# Patient Record
Sex: MALE | URBAN - NONMETROPOLITAN AREA
[De-identification: names, ages, dates, MRNs, and addresses within clinical notes are randomized per-mention and may not be internally consistent; named-entity substitution may affect disease eponyms.]

---

## 2018-07-02 ENCOUNTER — HISTORICAL (OUTPATIENT)
Dept: ADMINISTRATIVE | Facility: HOSPITAL | Age: 58
End: 2018-07-02

## 2019-07-05 ENCOUNTER — HISTORICAL (OUTPATIENT)
Dept: ADMINISTRATIVE | Facility: HOSPITAL | Age: 59
End: 2019-07-05

## 2021-09-27 ENCOUNTER — HISTORICAL (OUTPATIENT)
Dept: ADMINISTRATIVE | Facility: HOSPITAL | Age: 61
End: 2021-09-27

## 2024-07-22 ENCOUNTER — OFFICE VISIT (OUTPATIENT)
Dept: FAMILY MEDICINE | Facility: CLINIC | Age: 64
End: 2024-07-22
Payer: MEDICAID

## 2024-07-22 VITALS
SYSTOLIC BLOOD PRESSURE: 132 MMHG | BODY MASS INDEX: 28.77 KG/M2 | TEMPERATURE: 99 F | OXYGEN SATURATION: 95 % | HEIGHT: 66 IN | HEART RATE: 66 BPM | WEIGHT: 179 LBS | DIASTOLIC BLOOD PRESSURE: 78 MMHG

## 2024-07-22 DIAGNOSIS — G89.29 CHRONIC MIDLINE LOW BACK PAIN WITHOUT SCIATICA: ICD-10-CM

## 2024-07-22 DIAGNOSIS — M25.511 ACUTE PAIN OF BOTH SHOULDERS: ICD-10-CM

## 2024-07-22 DIAGNOSIS — M25.512 ACUTE PAIN OF BOTH SHOULDERS: ICD-10-CM

## 2024-07-22 DIAGNOSIS — M54.50 CHRONIC MIDLINE LOW BACK PAIN WITHOUT SCIATICA: ICD-10-CM

## 2024-07-22 DIAGNOSIS — Z76.89 ENCOUNTER TO ESTABLISH CARE: Primary | ICD-10-CM

## 2024-07-22 PROBLEM — Z78.9 DAILY CONSUMPTION OF ALCOHOL: Status: ACTIVE | Noted: 2024-07-22

## 2024-07-22 PROBLEM — F17.210 NICOTINE DEPENDENCE, CIGARETTES, UNCOMPLICATED: Status: ACTIVE | Noted: 2024-07-22

## 2024-07-22 PROCEDURE — 3078F DIAST BP <80 MM HG: CPT | Mod: CPTII,,, | Performed by: NURSE PRACTITIONER

## 2024-07-22 PROCEDURE — 83036 HEMOGLOBIN GLYCOSYLATED A1C: CPT | Performed by: NURSE PRACTITIONER

## 2024-07-22 PROCEDURE — 87389 HIV-1 AG W/HIV-1&-2 AB AG IA: CPT | Performed by: NURSE PRACTITIONER

## 2024-07-22 PROCEDURE — 3075F SYST BP GE 130 - 139MM HG: CPT | Mod: CPTII,,, | Performed by: NURSE PRACTITIONER

## 2024-07-22 PROCEDURE — 86803 HEPATITIS C AB TEST: CPT | Performed by: NURSE PRACTITIONER

## 2024-07-22 PROCEDURE — 85025 COMPLETE CBC W/AUTO DIFF WBC: CPT | Performed by: NURSE PRACTITIONER

## 2024-07-22 PROCEDURE — 84443 ASSAY THYROID STIM HORMONE: CPT | Performed by: NURSE PRACTITIONER

## 2024-07-22 PROCEDURE — 1159F MED LIST DOCD IN RCRD: CPT | Mod: CPTII,,, | Performed by: NURSE PRACTITIONER

## 2024-07-22 PROCEDURE — 80061 LIPID PANEL: CPT | Performed by: NURSE PRACTITIONER

## 2024-07-22 PROCEDURE — 99203 OFFICE O/P NEW LOW 30 MIN: CPT | Mod: ,,, | Performed by: NURSE PRACTITIONER

## 2024-07-22 PROCEDURE — 3008F BODY MASS INDEX DOCD: CPT | Mod: CPTII,,, | Performed by: NURSE PRACTITIONER

## 2024-07-22 PROCEDURE — 80053 COMPREHEN METABOLIC PANEL: CPT | Performed by: NURSE PRACTITIONER

## 2024-07-22 PROCEDURE — 1160F RVW MEDS BY RX/DR IN RCRD: CPT | Mod: CPTII,,, | Performed by: NURSE PRACTITIONER

## 2024-07-22 PROCEDURE — 84153 ASSAY OF PSA TOTAL: CPT | Performed by: NURSE PRACTITIONER

## 2024-07-22 RX ORDER — DICLOFENAC SODIUM 75 MG/1
75 TABLET, DELAYED RELEASE ORAL 2 TIMES DAILY
Qty: 60 TABLET | Refills: 0 | Status: SHIPPED | OUTPATIENT
Start: 2024-07-22 | End: 2024-08-21

## 2024-07-22 NOTE — ASSESSMENT & PLAN NOTE
Begin diclofenac b.i.d..  Avoid other NSAIDs while taking   Begin physical therapy  Ice as needed   Obtain x-rays of bilateral shoulders

## 2024-07-22 NOTE — ASSESSMENT & PLAN NOTE
Begin diclofenac b.i.d..  Avoid other NSAIDs while taking   Begin physical therapy  Ice as needed   Obtain x-rays of lumbar spine

## 2024-07-22 NOTE — PROGRESS NOTES
Patient ID: Tommy Ramirez  : 1960    Chief Complaint: Establish Care, Back Pain (Lower back. ), and Shoulder Pain (bilateral)    Allergies: Patient is allergic to penicillins.     History of Present Illness:  The patient is a 63 y.o. White male who presents to clinic for follow up on Establish Care, Back Pain (Lower back. ), and Shoulder Pain (bilateral). Last PCP was Dr. Hess many years ago.      He complains of pain to bilateral shoulders. Pain began a few weeks ago. Pain is worsened by overhead or heavy lifting. He also has a burning pain to his right arm. No weakness of the hands or arms. Denies any known injury or neck pain.     He complains of lower back pain that has been present for several years. Pain is constant and worsened by overuse. Mild improvement with ibuprofen.     He smokes 1/2 ppd; daily smoker for 50 years.     Social History:  reports that he has been smoking cigarettes. He started smoking about 53 years ago. He has a 26.8 pack-year smoking history. He has never used smokeless tobacco. He reports current alcohol use of about 28.0 standard drinks of alcohol per week. He reports that he does not use drugs.    Past Medical History:  has no past medical history on file.    Current Medications:  Current Outpatient Medications   Medication Instructions    diclofenac (VOLTAREN) 75 mg, Oral, 2 times daily       Review of Systems   Constitutional:  Negative for activity change, appetite change, fatigue and fever.   HENT:  Negative for ear pain, hearing loss and trouble swallowing.    Eyes:  Negative for pain, redness and visual disturbance.   Respiratory:  Negative for cough, chest tightness and shortness of breath.    Cardiovascular:  Negative for chest pain, palpitations, leg swelling and claudication.   Gastrointestinal:  Negative for abdominal pain, blood in stool, change in bowel habit, constipation, diarrhea, nausea and vomiting.   Endocrine: Negative for cold intolerance, heat  "intolerance, polydipsia, polyphagia and polyuria.   Genitourinary:  Negative for dysuria, frequency and hematuria.   Musculoskeletal:  Positive for arthralgias and back pain. Negative for gait problem and joint swelling.   Integumentary:  Negative for rash, wound and mole/lesion.   Neurological:  Negative for dizziness, seizures, weakness, headaches and memory loss.   Hematological:  Negative for adenopathy. Does not bruise/bleed easily.   Psychiatric/Behavioral:  Negative for confusion, sleep disturbance and suicidal ideas. The patient is not nervous/anxious.         Visit Vitals  /78   Pulse 66   Temp 98.7 °F (37.1 °C)   Ht 5' 6" (1.676 m)   Wt 81.2 kg (179 lb)   SpO2 95%   BMI 28.89 kg/m²       Physical Exam  Vitals reviewed.   Constitutional:       General: He is not in acute distress.     Appearance: Normal appearance. He is not ill-appearing.   HENT:      Right Ear: Tympanic membrane, ear canal and external ear normal.      Left Ear: Tympanic membrane, ear canal and external ear normal.      Mouth/Throat:      Mouth: Mucous membranes are moist.   Eyes:      General: No scleral icterus.     Extraocular Movements: Extraocular movements intact.      Conjunctiva/sclera: Conjunctivae normal.      Pupils: Pupils are equal, round, and reactive to light.   Cardiovascular:      Rate and Rhythm: Normal rate and regular rhythm.      Pulses: Normal pulses.      Heart sounds: Normal heart sounds.   Pulmonary:      Effort: Pulmonary effort is normal.      Breath sounds: Normal breath sounds.   Abdominal:      General: Bowel sounds are normal.      Palpations: Abdomen is soft.      Tenderness: There is no abdominal tenderness.   Musculoskeletal:      Right shoulder: Tenderness and bony tenderness present. Decreased range of motion. Decreased strength. Normal pulse.      Left shoulder: Tenderness and bony tenderness present. Decreased range of motion. Decreased strength. Normal pulse.      Cervical back: Normal range of " motion and neck supple. No tenderness.      Lumbar back: Bony tenderness present. Negative right straight leg raise test and negative left straight leg raise test.   Lymphadenopathy:      Cervical: No cervical adenopathy.   Skin:     General: Skin is warm and dry.   Neurological:      Mental Status: He is alert and oriented to person, place, and time. Mental status is at baseline.   Psychiatric:         Mood and Affect: Mood normal.         Thought Content: Thought content normal.         Judgment: Judgment normal.            Assessment & Plan:  1. Encounter to establish care  -     Cancel: CBC Auto Differential; Future; Expected date: 07/22/2024  -     Cancel: Comprehensive Metabolic Panel; Future; Expected date: 07/22/2024  -     Cancel: Lipid Panel; Future; Expected date: 07/22/2024  -     Cancel: TSH; Future; Expected date: 07/22/2024  -     Cancel: Hemoglobin A1C; Future; Expected date: 07/22/2024  -     Cancel: PSA, Screening; Future; Expected date: 07/22/2024  -     Cancel: HIV 1/2 Ag/Ab (4th Gen); Future; Expected date: 07/22/2024  -     Cancel: Hepatitis C Antibody; Future; Expected date: 07/22/2024  -     CBC Auto Differential  -     Comprehensive Metabolic Panel  -     Hemoglobin A1C  -     Hepatitis C Antibody  -     HIV 1/2 Ag/Ab (4th Gen)  -     Lipid Panel  -     PSA, Screening  -     TSH    2. Acute pain of both shoulders  Assessment & Plan:  Begin diclofenac b.i.d..  Avoid other NSAIDs while taking   Begin physical therapy  Ice as needed   Obtain x-rays of bilateral shoulders    Orders:  -     Ambulatory referral/consult to Physical/Occupational Therapy; Future; Expected date: 07/29/2024  -     X-Ray Shoulder Complete Bilateral; Future; Expected date: 07/22/2024  -     diclofenac (VOLTAREN) 75 MG EC tablet; Take 1 tablet (75 mg total) by mouth 2 (two) times daily.  Dispense: 60 tablet; Refill: 0    3. Chronic midline low back pain without sciatica  Assessment & Plan:  Begin diclofenac b.i.d..  Avoid  other NSAIDs while taking   Begin physical therapy  Ice as needed   Obtain x-rays of lumbar spine     Orders:  -     X-Ray Lumbar Spine AP And Lateral; Future; Expected date: 07/22/2024  -     diclofenac (VOLTAREN) 75 MG EC tablet; Take 1 tablet (75 mg total) by mouth 2 (two) times daily.  Dispense: 60 tablet; Refill: 0     Baseline labs today including CBC, CMP, TSH, FLP, A1c, hep C, HIV, PSA    Future Appointments   Date Time Provider Department Center   7/31/2024 11:00 AM Diya Beaver FNP [facility]       Follow up in about 1 week (around 7/29/2024) for lab review . Call sooner if needed.    TAMIKA Velez

## 2024-07-23 LAB
ALBUMIN SERPL-MCNC: 4.4 G/DL (ref 3.4–5)
ALBUMIN/GLOB SERPL: 1.4 RATIO
ALP SERPL-CCNC: 88 UNIT/L (ref 50–144)
ALT SERPL-CCNC: 30 UNIT/L (ref 1–45)
ANION GAP SERPL CALC-SCNC: 13 MEQ/L (ref 2–13)
AST SERPL-CCNC: 38 UNIT/L (ref 17–59)
BASOPHILS # BLD AUTO: 0.08 X10(3)/MCL (ref 0.01–0.08)
BASOPHILS NFR BLD AUTO: 1.2 % (ref 0.1–1.2)
BILIRUB SERPL-MCNC: 0.4 MG/DL (ref 0–1)
BUN SERPL-MCNC: 12 MG/DL (ref 7–20)
CALCIUM SERPL-MCNC: 9.8 MG/DL (ref 8.4–10.2)
CHLORIDE SERPL-SCNC: 105 MMOL/L (ref 98–110)
CHOLEST SERPL-MCNC: 161 MG/DL (ref 0–200)
CO2 SERPL-SCNC: 19 MMOL/L (ref 21–32)
CREAT SERPL-MCNC: 0.8 MG/DL (ref 0.66–1.25)
CREAT/UREA NIT SERPL: 15 (ref 12–20)
EOSINOPHIL # BLD AUTO: 0.45 X10(3)/MCL (ref 0.04–0.54)
EOSINOPHIL NFR BLD AUTO: 6.5 % (ref 0.7–7)
ERYTHROCYTE [DISTWIDTH] IN BLOOD BY AUTOMATED COUNT: 14.9 %
EST. AVERAGE GLUCOSE BLD GHB EST-MCNC: 114 MG/DL (ref 70–115)
GFR SERPLBLD CREATININE-BSD FMLA CKD-EPI: >90 ML/MIN/1.73/M2
GLOBULIN SER-MCNC: 3.1 GM/DL (ref 2–3.9)
GLUCOSE SERPL-MCNC: 87 MG/DL (ref 70–115)
HBA1C MFR BLD: 5.6 % (ref 4–6)
HCT VFR BLD AUTO: 49.9 % (ref 36–52)
HCV AB SERPL QL IA: NONREACTIVE
HDLC SERPL-MCNC: 61 MG/DL (ref 40–60)
HGB BLD-MCNC: 17.1 G/DL (ref 13–18)
HIV 1+2 AB+HIV1 P24 AG SERPL QL IA: NONREACTIVE
IMM GRANULOCYTES # BLD AUTO: 0.02 X10(3)/MCL (ref 0–0.03)
IMM GRANULOCYTES NFR BLD AUTO: 0.3 % (ref 0–0.5)
LDLC SERPL DIRECT ASSAY-SCNC: 94.7 MG/DL (ref 30–100)
LYMPHOCYTES # BLD AUTO: 2.14 X10(3)/MCL (ref 1.32–3.57)
LYMPHOCYTES NFR BLD AUTO: 30.8 % (ref 20–55)
MCH RBC QN AUTO: 31.3 PG (ref 27–34)
MCHC RBC AUTO-ENTMCNC: 34.3 G/DL (ref 31–37)
MCV RBC AUTO: 91.4 FL (ref 79–99)
MONOCYTES # BLD AUTO: 0.72 X10(3)/MCL (ref 0.3–0.82)
MONOCYTES NFR BLD AUTO: 10.4 % (ref 4.7–12.5)
NEUTROPHILS # BLD AUTO: 3.54 X10(3)/MCL (ref 1.78–5.38)
NEUTROPHILS NFR BLD AUTO: 50.8 % (ref 37–73)
NRBC BLD AUTO-RTO: 0 %
PLATELET # BLD AUTO: 153 X10(3)/MCL (ref 140–371)
PMV BLD AUTO: 12.5 FL (ref 9.4–12.4)
POTASSIUM SERPL-SCNC: 5.3 MMOL/L (ref 3.5–5.1)
PROT SERPL-MCNC: 7.5 GM/DL (ref 6.3–8.2)
PSA SERPL-MCNC: 0.87 NG/ML
RBC # BLD AUTO: 5.46 X10(6)/MCL (ref 4–6)
SODIUM SERPL-SCNC: 137 MMOL/L (ref 136–145)
TRIGL SERPL-MCNC: 122 MG/DL (ref 30–200)
TSH SERPL-ACNC: 1.73 UIU/ML (ref 0.36–3.74)
WBC # BLD AUTO: 6.95 X10(3)/MCL (ref 4–11.5)

## 2024-07-31 ENCOUNTER — OFFICE VISIT (OUTPATIENT)
Dept: FAMILY MEDICINE | Facility: CLINIC | Age: 64
End: 2024-07-31
Payer: MEDICAID

## 2024-07-31 VITALS
OXYGEN SATURATION: 95 % | TEMPERATURE: 99 F | HEIGHT: 66 IN | DIASTOLIC BLOOD PRESSURE: 84 MMHG | BODY MASS INDEX: 28.61 KG/M2 | HEART RATE: 72 BPM | WEIGHT: 178 LBS | SYSTOLIC BLOOD PRESSURE: 146 MMHG

## 2024-07-31 DIAGNOSIS — Z12.5 SCREENING FOR MALIGNANT NEOPLASM OF PROSTATE: ICD-10-CM

## 2024-07-31 DIAGNOSIS — G89.29 CHRONIC MIDLINE LOW BACK PAIN WITHOUT SCIATICA: ICD-10-CM

## 2024-07-31 DIAGNOSIS — Z12.11 SCREEN FOR COLON CANCER: ICD-10-CM

## 2024-07-31 DIAGNOSIS — R03.0 BLOOD PRESSURE ELEVATED WITHOUT HISTORY OF HTN: ICD-10-CM

## 2024-07-31 DIAGNOSIS — F17.210 NICOTINE DEPENDENCE, CIGARETTES, UNCOMPLICATED: Primary | ICD-10-CM

## 2024-07-31 DIAGNOSIS — Z80.0 FAMILY HISTORY OF COLON CANCER: ICD-10-CM

## 2024-07-31 DIAGNOSIS — M54.50 CHRONIC MIDLINE LOW BACK PAIN WITHOUT SCIATICA: ICD-10-CM

## 2024-07-31 RX ORDER — DICLOFENAC SODIUM 75 MG/1
75 TABLET, DELAYED RELEASE ORAL 2 TIMES DAILY PRN
Qty: 60 TABLET | Refills: 11 | Status: SHIPPED | OUTPATIENT
Start: 2024-07-31 | End: 2025-07-31

## 2024-07-31 NOTE — ASSESSMENT & PLAN NOTE
Mildly elevated today but was normal at initial visit   Low Sodium/DASH Diet.  Monitor blood pressure daily, record, and bring log to next visit. Report consistent numbers greater than 140/90.  Maintain healthy weight with goal BMI <30. Exercise 30 minutes per day, 5 days per week.  Smoking cessation encouraged to aid in BP reduction.

## 2024-07-31 NOTE — PROGRESS NOTES
"Patient ID: Tommy Ramirez  : 1960    Chief Complaint: Follow-up and Results (New pt labs done last week. )    Allergies: Patient is allergic to penicillins.     History of Present Illness:  The patient is a 63 y.o. White male who presents to clinic for follow up on Follow-up and Results (New pt labs done last week. )  Diclofenac was initiated for pain to back and shoulders. Shoulder pain has resolved. Lower back pain has also improved. Now present intermittently and without radiation of the pain. Does not limit daily activities.     No prior screening colon cancer. Father  due to colon cancer in 70s.  He denies constipation, diarrhea, changes in bowel habits, or hematochezia.    Daily smoker- 1/2 ppd-1 ppd  for 50 years.     Social History:  reports that he has been smoking cigarettes. He started smoking about 53 years ago. He has a 26.8 pack-year smoking history. He has never used smokeless tobacco. He reports current alcohol use of about 28.0 standard drinks of alcohol per week. He reports that he does not use drugs.    Past Medical History:  has no past medical history on file.    Current Medications:  Current Outpatient Medications   Medication Instructions    diclofenac (VOLTAREN) 75 mg, Oral, 2 times daily PRN       ROS: See HPI    Visit Vitals  BP (!) 168/80   Pulse 72   Temp 98.5 °F (36.9 °C)   Ht 5' 6" (1.676 m)   Wt 80.7 kg (178 lb)   SpO2 95%   BMI 28.73 kg/m²       Physical Exam  Vitals reviewed.   Constitutional:       Appearance: Normal appearance.   Cardiovascular:      Rate and Rhythm: Normal rate and regular rhythm.      Heart sounds: Normal heart sounds.   Pulmonary:      Effort: Pulmonary effort is normal.      Breath sounds: Normal breath sounds.   Abdominal:      General: Bowel sounds are normal.      Palpations: Abdomen is soft.      Tenderness: There is no abdominal tenderness.   Skin:     General: Skin is warm and dry.   Neurological:      Mental Status: He is alert and " oriented to person, place, and time. Mental status is at baseline.          Labs Reviewed:  Chemistry:  Lab Results   Component Value Date     07/22/2024    K 5.3 (H) 07/22/2024    BUN 12 07/22/2024    CREATININE 0.80 07/22/2024    EGFRNORACEVR >90 07/22/2024    GLUCOSE 87 07/22/2024    CALCIUM 9.8 07/22/2024    ALKPHOS 88 07/22/2024    LABPROT 7.5 07/22/2024    ALBUMIN 4.4 07/22/2024    AST 38 07/22/2024    ALT 30 07/22/2024    TSH 1.730 07/22/2024    PSA 0.87 07/22/2024        Lab Results   Component Value Date    HGBA1C 5.6 07/22/2024        Hematology:  Lab Results   Component Value Date    WBC 6.95 07/22/2024    RBC 5.46 07/22/2024    HGB 17.1 07/22/2024    HCT 49.9 07/22/2024    MCV 91.4 07/22/2024    MCH 31.3 07/22/2024    MCHC 34.3 07/22/2024    RDW 14.9 07/22/2024     07/22/2024    MPV 12.5 (H) 07/22/2024       Lipid Panel:  Lab Results   Component Value Date    CHOL 161 07/22/2024    HDL 61 (H) 07/22/2024    TRIG 122 07/22/2024        Assessment & Plan:  1. Nicotine dependence, cigarettes, uncomplicated  Assessment & Plan:  Advise complete cessation   Obtain baseline low-dose CT chest as screening for lung cancer    Orders:  -     CT Chest Lung Screening Low Dose; Future; Expected date: 07/31/2024    2. Chronic midline low back pain without sciatica  Assessment & Plan:  X-ray ordered but not performed   May continue diclofenac as needed    Orders:  -     diclofenac (VOLTAREN) 75 MG EC tablet; Take 1 tablet (75 mg total) by mouth 2 (two) times daily as needed (pain).  Dispense: 60 tablet; Refill: 11    3. Blood pressure elevated without history of HTN  Assessment & Plan:  Mildly elevated today but was normal at initial visit   Low Sodium/DASH Diet.  Monitor blood pressure daily, record, and bring log to next visit. Report consistent numbers greater than 140/90.  Maintain healthy weight with goal BMI <30. Exercise 30 minutes per day, 5 days per week.  Smoking cessation encouraged to aid in BP  reduction.        4. Screening for malignant neoplasm of prostate  Assessment & Plan:  PSA 0.87      5. Family history of colon cancer  -     Ambulatory referral/consult to General Surgery; Future; Expected date: 08/07/2024    6. Screen for colon cancer  Assessment & Plan:  Refer to general surgery for colonoscopy    Orders:  -     Ambulatory referral/consult to General Surgery; Future; Expected date: 08/07/2024       Discussed results of labs; all questions answered     Future Appointments   Date Time Provider Department Center   8/19/2024  2:30 PM Lisha Chavez NP [facility]       Follow up in about 2 weeks (around 8/14/2024) for BP. Call sooner if needed.    TAMIKA Velez    Lab Frequency Next Occurrence   Ambulatory referral/consult to Physical/Occupational Therapy Once 07/29/2024   X-Ray Lumbar Spine AP And Lateral Once 07/22/2024   X-Ray Shoulder Complete Bilateral Once 07/22/2024

## 2024-08-19 ENCOUNTER — OFFICE VISIT (OUTPATIENT)
Dept: FAMILY MEDICINE | Facility: CLINIC | Age: 64
End: 2024-08-19
Payer: MEDICAID

## 2024-08-19 VITALS
DIASTOLIC BLOOD PRESSURE: 84 MMHG | HEIGHT: 66 IN | TEMPERATURE: 98 F | HEART RATE: 73 BPM | SYSTOLIC BLOOD PRESSURE: 166 MMHG | OXYGEN SATURATION: 97 % | WEIGHT: 177 LBS | BODY MASS INDEX: 28.45 KG/M2

## 2024-08-19 DIAGNOSIS — I10 PRIMARY HYPERTENSION: Primary | ICD-10-CM

## 2024-08-19 PROCEDURE — 3077F SYST BP >= 140 MM HG: CPT | Mod: CPTII,,, | Performed by: NURSE PRACTITIONER

## 2024-08-19 PROCEDURE — 3079F DIAST BP 80-89 MM HG: CPT | Mod: CPTII,,, | Performed by: NURSE PRACTITIONER

## 2024-08-19 PROCEDURE — 1159F MED LIST DOCD IN RCRD: CPT | Mod: CPTII,,, | Performed by: NURSE PRACTITIONER

## 2024-08-19 PROCEDURE — 1160F RVW MEDS BY RX/DR IN RCRD: CPT | Mod: CPTII,,, | Performed by: NURSE PRACTITIONER

## 2024-08-19 PROCEDURE — 3008F BODY MASS INDEX DOCD: CPT | Mod: CPTII,,, | Performed by: NURSE PRACTITIONER

## 2024-08-19 PROCEDURE — 3044F HG A1C LEVEL LT 7.0%: CPT | Mod: CPTII,,, | Performed by: NURSE PRACTITIONER

## 2024-08-19 PROCEDURE — 99213 OFFICE O/P EST LOW 20 MIN: CPT | Mod: ,,, | Performed by: NURSE PRACTITIONER

## 2024-08-19 PROCEDURE — 4010F ACE/ARB THERAPY RXD/TAKEN: CPT | Mod: CPTII,,, | Performed by: NURSE PRACTITIONER

## 2024-08-19 RX ORDER — LOSARTAN POTASSIUM 25 MG/1
25 TABLET ORAL DAILY
Qty: 60 TABLET | Refills: 0 | Status: SHIPPED | OUTPATIENT
Start: 2024-08-19

## 2024-08-19 NOTE — PROGRESS NOTES
"Patient ID: Tommy Ramirez  : 1960    Chief Complaint: Follow-up    Allergies: Patient is allergic to penicillins.     History of Present Illness:  The patient presents to clinic for Follow-up BP check.  Has not completed low-dose CT of chest as screening for lung cancer.  X-ray ordered for back pain has not completed yet.  Referral sent 2024 for colonoscopy.  Patient without appointment yet.    Social History:  reports that he has been smoking cigarettes. He started smoking about 53 years ago. He has a 26.8 pack-year smoking history. He has never used smokeless tobacco. He reports current alcohol use of about 28.0 standard drinks of alcohol per week. He reports that he does not use drugs.    Past Medical History:  has no past medical history on file.    Surgical History: History reviewed. No pertinent surgical history.    Current Medications:  Current Outpatient Medications   Medication Instructions    diclofenac sodium (VOLTAREN ARTHRITIS PAIN) 2 g, Topical (Top), 4 times daily    losartan (COZAAR) 25 mg, Oral, Daily       Review of Systems   A comprehensive review of symptoms was completed and negative except as noted above.    Visit Vitals  BP (!) 166/84   Pulse 73   Temp 97.8 °F (36.6 °C)   Ht 5' 6" (1.676 m)   Wt 80.3 kg (177 lb)   SpO2 97%   BMI 28.57 kg/m²       Physical Exam  Vitals and nursing note reviewed.   Constitutional:       General: He is not in acute distress.     Appearance: Normal appearance. He is not toxic-appearing.   HENT:      Head: Normocephalic and atraumatic.      Nose: Nose normal.      Mouth/Throat:      Mouth: Mucous membranes are moist.      Pharynx: Oropharynx is clear.   Eyes:      Extraocular Movements: Extraocular movements intact.      Conjunctiva/sclera: Conjunctivae normal.      Pupils: Pupils are equal, round, and reactive to light.   Cardiovascular:      Rate and Rhythm: Normal rate and regular rhythm.      Heart sounds: Normal heart sounds. No murmur " heard.     No friction rub. No gallop.   Pulmonary:      Effort: Pulmonary effort is normal.      Breath sounds: Normal breath sounds.   Musculoskeletal:         General: Normal range of motion.      Cervical back: Normal range of motion and neck supple.   Skin:     General: Skin is warm and dry.      Coloration: Skin is not jaundiced or pale.      Findings: No rash.   Neurological:      General: No focal deficit present.      Mental Status: He is alert and oriented to person, place, and time. Mental status is at baseline.   Psychiatric:         Mood and Affect: Mood normal.         Behavior: Behavior normal.         Thought Content: Thought content normal.         Judgment: Judgment normal.          Labs Reviewed:  Chemistry:  Lab Results   Component Value Date     07/22/2024    K 5.3 (H) 07/22/2024    BUN 12 07/22/2024    CREATININE 0.80 07/22/2024    EGFRNORACEVR >90 07/22/2024    GLUCOSE 87 07/22/2024    CALCIUM 9.8 07/22/2024    ALKPHOS 88 07/22/2024    LABPROT 7.5 07/22/2024    ALBUMIN 4.4 07/22/2024    AST 38 07/22/2024    ALT 30 07/22/2024    TSH 1.730 07/22/2024    PSA 0.87 07/22/2024        Lab Results   Component Value Date    HGBA1C 5.6 07/22/2024        Hematology:  Lab Results   Component Value Date    WBC 6.95 07/22/2024    RBC 5.46 07/22/2024    HGB 17.1 07/22/2024    HCT 49.9 07/22/2024    MCV 91.4 07/22/2024    MCH 31.3 07/22/2024    MCHC 34.3 07/22/2024    RDW 14.9 07/22/2024     07/22/2024    MPV 12.5 (H) 07/22/2024       Lipid Panel:  Lab Results   Component Value Date    CHOL 161 07/22/2024    HDL 61 (H) 07/22/2024    TRIG 122 07/22/2024        Assessment & Plan:  1. Primary hypertension  -     losartan (COZAAR) 25 MG tablet; Take 1 tablet (25 mg total) by mouth once daily.  Dispense: 60 tablet; Refill: 0         Follow up in about 2 weeks (around 9/2/2024) for BP Check, XR f/u, CT f/u.   Return to the clinic as needed.    Future Appointments   Date Time Provider Department Center    9/9/2024 10:30 AM Lisha Chavez, [facility]       Lab Frequency Next Occurrence   Ambulatory referral/consult to Physical/Occupational Therapy Once 07/29/2024   X-Ray Lumbar Spine AP And Lateral Once 07/22/2024   X-Ray Shoulder Complete Bilateral Once 07/22/2024   Ambulatory referral/consult to General Surgery Once 08/07/2024   CT Chest Lung Screening Low Dose Once 07/31/2024        Lisha Chavez, DAVIDP-C

## 2024-08-26 ENCOUNTER — HOSPITAL ENCOUNTER (OUTPATIENT)
Dept: RADIOLOGY | Facility: HOSPITAL | Age: 64
Discharge: HOME | End: 2024-08-26
Attending: NURSE PRACTITIONER
Payer: MEDICAID

## 2024-08-26 ENCOUNTER — TELEPHONE (OUTPATIENT)
Dept: FAMILY MEDICINE | Facility: CLINIC | Age: 64
End: 2024-08-26
Payer: MEDICAID

## 2024-08-26 DIAGNOSIS — M25.511 ACUTE PAIN OF BOTH SHOULDERS: Primary | ICD-10-CM

## 2024-08-26 DIAGNOSIS — M54.50 CHRONIC MIDLINE LOW BACK PAIN WITHOUT SCIATICA: ICD-10-CM

## 2024-08-26 DIAGNOSIS — M25.512 ACUTE PAIN OF BOTH SHOULDERS: Primary | ICD-10-CM

## 2024-08-26 DIAGNOSIS — M25.511 ACUTE PAIN OF BOTH SHOULDERS: ICD-10-CM

## 2024-08-26 DIAGNOSIS — M25.512 ACUTE PAIN OF BOTH SHOULDERS: ICD-10-CM

## 2024-08-26 DIAGNOSIS — G89.29 CHRONIC MIDLINE LOW BACK PAIN WITHOUT SCIATICA: ICD-10-CM

## 2024-08-26 DIAGNOSIS — Z87.891 PERSONAL HISTORY OF SMOKING: ICD-10-CM

## 2024-08-26 PROCEDURE — 73030 X-RAY EXAM OF SHOULDER: CPT | Mod: TC,50

## 2024-08-26 PROCEDURE — 71271 CT THORAX LUNG CANCER SCR C-: CPT | Mod: TC

## 2024-08-26 PROCEDURE — 72100 X-RAY EXAM L-S SPINE 2/3 VWS: CPT | Mod: TC

## 2024-08-26 RX ORDER — DICLOFENAC SODIUM 10 MG/G
2 GEL TOPICAL 4 TIMES DAILY
Qty: 450 G | Refills: 0 | Status: SHIPPED | OUTPATIENT
Start: 2024-08-26

## 2024-08-26 NOTE — TELEPHONE ENCOUNTER
Was told that we had to stop diclofenac by mouth and change to the gel due to oral is bad for kidney

## 2024-09-09 ENCOUNTER — OFFICE VISIT (OUTPATIENT)
Dept: FAMILY MEDICINE | Facility: CLINIC | Age: 64
End: 2024-09-09
Payer: MEDICAID

## 2024-09-09 DIAGNOSIS — M25.511 CHRONIC PAIN OF BOTH SHOULDERS: ICD-10-CM

## 2024-09-09 DIAGNOSIS — M54.50 LUMBAR BACK PAIN: ICD-10-CM

## 2024-09-09 DIAGNOSIS — Z71.2 PERSON CONSULTING FOR EXPLANATION OF EXAMINATION OR TEST FINDING: ICD-10-CM

## 2024-09-09 DIAGNOSIS — M25.512 CHRONIC PAIN OF BOTH SHOULDERS: ICD-10-CM

## 2024-09-09 DIAGNOSIS — G89.29 CHRONIC PAIN OF BOTH SHOULDERS: ICD-10-CM

## 2024-09-09 DIAGNOSIS — F17.200 TOBACCO DEPENDENCE: ICD-10-CM

## 2024-09-09 DIAGNOSIS — Z76.89 ENCOUNTER TO ESTABLISH CARE: ICD-10-CM

## 2024-09-09 DIAGNOSIS — I70.90 ATHEROSCLEROSIS OF ARTERIES: ICD-10-CM

## 2024-09-09 DIAGNOSIS — I10 PRIMARY HYPERTENSION: Primary | ICD-10-CM

## 2024-09-09 PROCEDURE — 3077F SYST BP >= 140 MM HG: CPT | Mod: CPTII,,, | Performed by: NURSE PRACTITIONER

## 2024-09-09 PROCEDURE — 3078F DIAST BP <80 MM HG: CPT | Mod: CPTII,,, | Performed by: NURSE PRACTITIONER

## 2024-09-09 PROCEDURE — G2211 COMPLEX E/M VISIT ADD ON: HCPCS | Mod: ,,, | Performed by: NURSE PRACTITIONER

## 2024-09-09 PROCEDURE — 1159F MED LIST DOCD IN RCRD: CPT | Mod: CPTII,,, | Performed by: NURSE PRACTITIONER

## 2024-09-09 PROCEDURE — 3008F BODY MASS INDEX DOCD: CPT | Mod: CPTII,,, | Performed by: NURSE PRACTITIONER

## 2024-09-09 PROCEDURE — 3044F HG A1C LEVEL LT 7.0%: CPT | Mod: CPTII,,, | Performed by: NURSE PRACTITIONER

## 2024-09-09 PROCEDURE — 4010F ACE/ARB THERAPY RXD/TAKEN: CPT | Mod: CPTII,,, | Performed by: NURSE PRACTITIONER

## 2024-09-09 PROCEDURE — 1160F RVW MEDS BY RX/DR IN RCRD: CPT | Mod: CPTII,,, | Performed by: NURSE PRACTITIONER

## 2024-09-09 PROCEDURE — 99214 OFFICE O/P EST MOD 30 MIN: CPT | Mod: ,,, | Performed by: NURSE PRACTITIONER

## 2024-09-09 NOTE — PROGRESS NOTES
"Patient ID: Tommy Ramirez  : 1960    Chief Complaint: Follow-up and Results    Allergies: Patient is allergic to penicillins.     History of Present Illness:  The patient presents to clinic for Follow-up and Results.     Social History:  reports that he has been smoking cigarettes. He started smoking about 53 years ago. He has a 26.9 pack-year smoking history. He has never used smokeless tobacco. He reports current alcohol use of about 28.0 standard drinks of alcohol per week. He reports that he does not use drugs.    Past Medical History:  has a past medical history of Hypertension.    Surgical History: History reviewed. No pertinent surgical history.    Current Medications:  Current Outpatient Medications   Medication Instructions    diclofenac sodium (VOLTAREN ARTHRITIS PAIN) 2 g, Topical (Top), 4 times daily    losartan (COZAAR) 25 mg, Oral, Daily       Review of Systems   A comprehensive review of symptoms was completed and negative except as noted above.    Visit Vitals  BP (!) 158/78   Pulse 86   Temp 97.3 °F (36.3 °C)   Ht 5' 6" (1.676 m)   Wt 85.7 kg (189 lb)   SpO2 99%   BMI 30.51 kg/m²       Physical Exam  Vitals and nursing note reviewed.   Constitutional:       General: He is not in acute distress.     Appearance: Normal appearance. He is not toxic-appearing.   HENT:      Head: Normocephalic and atraumatic.      Nose: Nose normal.      Mouth/Throat:      Mouth: Mucous membranes are moist.      Pharynx: Oropharynx is clear.   Eyes:      Extraocular Movements: Extraocular movements intact.      Conjunctiva/sclera: Conjunctivae normal.      Pupils: Pupils are equal, round, and reactive to light.   Cardiovascular:      Rate and Rhythm: Normal rate and regular rhythm.      Heart sounds: Normal heart sounds. No murmur heard.     No friction rub. No gallop.   Pulmonary:      Effort: Pulmonary effort is normal.      Breath sounds: Normal breath sounds.   Musculoskeletal:         General: Normal range " of motion.      Cervical back: Normal range of motion and neck supple.   Skin:     General: Skin is warm and dry.      Coloration: Skin is not jaundiced or pale.      Findings: No rash.   Neurological:      General: No focal deficit present.      Mental Status: He is alert and oriented to person, place, and time. Mental status is at baseline.   Psychiatric:         Mood and Affect: Mood normal.         Behavior: Behavior normal.         Thought Content: Thought content normal.         Judgment: Judgment normal.          Labs Reviewed:  Chemistry:  Lab Results   Component Value Date     07/22/2024    K 5.3 (H) 07/22/2024    BUN 12 07/22/2024    CREATININE 0.80 07/22/2024    EGFRNORACEVR >90 07/22/2024    GLUCOSE 87 07/22/2024    CALCIUM 9.8 07/22/2024    ALKPHOS 88 07/22/2024    LABPROT 7.5 07/22/2024    ALBUMIN 4.4 07/22/2024    AST 38 07/22/2024    ALT 30 07/22/2024    TSH 1.730 07/22/2024    PSA 0.87 07/22/2024        Lab Results   Component Value Date    HGBA1C 5.6 07/22/2024        Hematology:  Lab Results   Component Value Date    WBC 6.95 07/22/2024    RBC 5.46 07/22/2024    HGB 17.1 07/22/2024    HCT 49.9 07/22/2024    MCV 91.4 07/22/2024    MCH 31.3 07/22/2024    MCHC 34.3 07/22/2024    RDW 14.9 07/22/2024     07/22/2024    MPV 12.5 (H) 07/22/2024       Lipid Panel:  Lab Results   Component Value Date    CHOL 161 07/22/2024    HDL 61 (H) 07/22/2024    TRIG 122 07/22/2024      CT Chest Lung Screening Low Dose  Order: [id number]  Status: Final result       Visible to patient: No (inaccessible in MyChart)       Next appt: 09/23/2024 at 10:00 AM in Family Medicine (Lisha Chavez NP)       Dx: Personal history of smoking    0 Result Notes       1  Topic  Assessment    Negative   Details    [place] Physician Reading Date Result Priority   Santos Torres III, MD  [phone] 8/26/2024 Routine     Narrative & Impression  EXAMINATION:  STUDY:CT CHEST LUNG SCREENING LOW DOSE     CLINICAL HISTORY AND  TECHNIQUE:  Smoking history, cancer screening     This patient has had 0 CT and nuclear medicine scans performed within the last 12 months.     The following DOSE REDUCTION TECHNIQUES are used for all CT scans at [facility]:     1. Automated exposure control.  2. Adjustment of the mA and/or kv according to patient size.  3. Use of iterative reconstruction technique.     COMPARISON:  None     FINDINGS:  Lungs: I see no worrisome parenchymal masses/nodules or focal consolidation.     Airways: No clinically significant abnormalities noted.     Mediastinum and hilar regions:No worrisome hilar mediastinal masses or matted lymphadenopathy are appreciated.     Vascular structures: Mild to moderate atherosclerotic plaquing is noted within the thoracic aorta and its primary branches including the coronary arteries.     Musculoskeletal structures: Moderate degenerative changes are noted throughout the thoracic spine.     Miscellaneous: N/A     Impression:     1. Chronic changes are present as described above.  See above comments.  2. I see no worrisome parenchymal masses/nodules or focal consolidation (lung rads category 1).          X-Ray Lumbar Spine AP And Lateral  Order: [id number]  Status: Final result       Visible to patient: No (inaccessible in MyChart)       Next appt: 09/23/2024 at 10:00 AM in Family Medicine (Lisha Chavez NP)       Dx: Chronic midline low back pain without...    0 Result Notes  Details    [place] Physician Reading Date Result Priority   Santos Torres III, MD  [phone] 8/26/2024 Routine     Narrative & Impression  EXAMINATION:  STUDY:XR LUMBAR SPINE AP AND LATERAL     CLINICAL HISTORY AND TECHNIQUE:  Midline lower back pain     COMPARISON:  None     FINDINGS:  Miscellaneous: Prominent, anterior bridging osteophytes and right (and to a lesser extent left) lateral bridging osteophytes are noted throughout the lumbar spine.  Mild disc space narrowing is noted at the L5-S1  level.  Fairly prominent facet joint spurring is noted throughout the lumbar spine.  Atherosclerotic calcifications are noted within the abdominal aorta with no evidence of aneurysmal dilatation.     Fractures:  No acute fractures are noted.     Alignment: No clinically significant spondylolisthesis is noted.     Soft tissue: I see no evidence of focal soft tissue swelling or paravertebral hematomas.     Impression:     1. Chronic changes are present as described above in miscellaneous.  See above comments.  2.   No acute fractures or clinically significant spondylolisthesis are noted.        Electronically signed by:Santos Torres  Date:                                            08/26/2024  Time:                                           09:48     X-Ray Shoulder Complete Bilateral  Order: [id number]  Status: Final result       Visible to patient: No (inaccessible in MyChart)       Next appt: 09/23/2024 at 10:00 AM in Family Medicine (Lisha Chavez NP)       Dx: Acute pain of both shoulders    2 Result Notes  Details    [place] Physician Reading Date Result Priority   Santos Torres III, MD  [phone] 8/26/2024 Routine     Narrative & Impression  EXAMINATION:  STUDY: XR SHOULDER COMPLETE 2 OR MORE VIEWS BILATERAL     CLINICAL HISTORY AND TECHNIQUE:  Pain     COMPARISON:  09/27/2021     FINDINGS:  Moderate degenerative changes are noted involving the acromioclavicular joint and to a slightly lesser extent the glenohumeral joint including moderate joint space narrowing with mild subchondral sclerosis and moderate hypertrophic spur formation.  I see no definite fractures, dislocations, or other significant abnormalities.     Impression:     1. Chronic changes are present as described above.        Electronically signed by:Santos Torres  Date:                                            08/26/2024  Time:                                           09:27       Assessment & Plan:   1. Primary hypertension  Blood pressure  uncontrolled.  Blood pressure 158/78.  Increase losartan to 50 mg p.o. daily.    Low Sodium Diet (DASH Diet - Less than 2 grams of sodium per day).  Monitor blood pressure daily and log. Report consistent numbers greater than 140/90.  Maintain healthy weight with goal BMI <30. Exercise 30 minutes per day, 5 days per week.  Smoking cessation encouraged to aid in BP reduction.    2. Person consulting for explanation of examination or test finding  Imaging reviewed.    3. Encounter to establish care  - Ambulatory referral/consult to Cardiology; Future    4. Lumbar back pain  Physical therapy.    MR high-flow by was physical therapy completed.    5. Atherosclerosis of arteries  Abnormal CT for lungs showing atherosclerosis of coronary arteries  - Ambulatory referral/consult to Cardiology; Future    6. Tobacco dependence    7. Chronic pain of both shoulders  - Ambulatory referral/consult to Orthopedics; Future      Follow up in about 2 weeks (around 9/23/2024) for BP Check.   Return to the clinic as needed.    Future Appointments   Date Time Provider Department Center   9/23/2024 10:00 AM Lisha Chavez NP [facility]         Lab Frequency Next Occurrence   Ambulatory referral/consult to Physical/Occupational Therapy Once 07/29/2024   Ambulatory referral/consult to General Surgery Once 08/07/2024        HENRY Jolley

## 2024-09-12 ENCOUNTER — TELEPHONE (OUTPATIENT)
Dept: FAMILY MEDICINE | Facility: CLINIC | Age: 64
End: 2024-09-12
Payer: MEDICAID

## 2024-09-12 DIAGNOSIS — I10 PRIMARY HYPERTENSION: ICD-10-CM

## 2024-09-12 RX ORDER — LOSARTAN POTASSIUM 50 MG/1
25 TABLET ORAL DAILY
Qty: 30 TABLET | Refills: 2 | Status: SHIPPED | OUTPATIENT
Start: 2024-09-12

## 2024-09-12 NOTE — TELEPHONE ENCOUNTER
----- Message from Luci Quispe sent at 9/12/2024  1:55 PM CDT -----  Regarding: Med refill  His Losartan was recently raised and he does not have any more since he now has to take the new dose twice a day.  He is out as of today    [facility]

## 2024-09-15 VITALS
HEIGHT: 66 IN | OXYGEN SATURATION: 99 % | DIASTOLIC BLOOD PRESSURE: 78 MMHG | HEART RATE: 86 BPM | WEIGHT: 189 LBS | SYSTOLIC BLOOD PRESSURE: 158 MMHG | BODY MASS INDEX: 30.37 KG/M2 | TEMPERATURE: 97 F

## 2024-09-23 ENCOUNTER — OFFICE VISIT (OUTPATIENT)
Dept: FAMILY MEDICINE | Facility: CLINIC | Age: 64
End: 2024-09-23
Payer: MEDICAID

## 2024-09-23 DIAGNOSIS — F17.200 TOBACCO DEPENDENCE: ICD-10-CM

## 2024-09-23 DIAGNOSIS — I10 PRIMARY HYPERTENSION: Primary | ICD-10-CM

## 2024-09-23 PROCEDURE — 3079F DIAST BP 80-89 MM HG: CPT | Mod: CPTII,,, | Performed by: NURSE PRACTITIONER

## 2024-09-23 PROCEDURE — 1159F MED LIST DOCD IN RCRD: CPT | Mod: CPTII,,, | Performed by: NURSE PRACTITIONER

## 2024-09-23 PROCEDURE — 3008F BODY MASS INDEX DOCD: CPT | Mod: CPTII,,, | Performed by: NURSE PRACTITIONER

## 2024-09-23 PROCEDURE — G2211 COMPLEX E/M VISIT ADD ON: HCPCS | Mod: ,,, | Performed by: NURSE PRACTITIONER

## 2024-09-23 PROCEDURE — 4010F ACE/ARB THERAPY RXD/TAKEN: CPT | Mod: CPTII,,, | Performed by: NURSE PRACTITIONER

## 2024-09-23 PROCEDURE — 3044F HG A1C LEVEL LT 7.0%: CPT | Mod: CPTII,,, | Performed by: NURSE PRACTITIONER

## 2024-09-23 PROCEDURE — 1160F RVW MEDS BY RX/DR IN RCRD: CPT | Mod: CPTII,,, | Performed by: NURSE PRACTITIONER

## 2024-09-23 PROCEDURE — 99213 OFFICE O/P EST LOW 20 MIN: CPT | Mod: ,,, | Performed by: NURSE PRACTITIONER

## 2024-09-23 PROCEDURE — 3077F SYST BP >= 140 MM HG: CPT | Mod: CPTII,,, | Performed by: NURSE PRACTITIONER

## 2024-09-23 NOTE — PROGRESS NOTES
"Patient ID: Tommy Ramirez  : 1960    Chief Complaint: Follow-up and Hypertension (2 week BP follow up. Elevated at last appt.)    Allergies: Patient is allergic to penicillins.     History of Present Illness:  Patient presents to clinic for f/u BP check. Compliant with meds. Denies chest pain, SOB, headaches, difficulty breathing.     Social History:  reports that he has been smoking cigarettes. He started smoking about 53 years ago. He has a 26.9 pack-year smoking history. He has never used smokeless tobacco. He reports current alcohol use of about 28.0 standard drinks of alcohol per week. He reports that he does not use drugs.    Past Medical History:  has a past medical history of Hypertension.    Surgical History: History reviewed. No pertinent surgical history.    Current Medications:  Current Outpatient Medications   Medication Instructions    diclofenac sodium (VOLTAREN ARTHRITIS PAIN) 2 g, Topical (Top), 4 times daily    losartan (COZAAR) 25 mg, Oral, Daily       Review of Systems   A comprehensive review of symptoms was completed and negative except as noted above.    Visit Vitals  BP (!) 142/88 (Patient Position: Sitting)   Pulse 73   Temp 98.1 °F (36.7 °C)   Ht 5' 6" (1.676 m)   Wt 80.7 kg (178 lb)   SpO2 97%   BMI 28.73 kg/m²       Physical Exam  Vitals and nursing note reviewed.   Constitutional:       General: He is not in acute distress.     Appearance: Normal appearance. He is not toxic-appearing.   HENT:      Head: Normocephalic and atraumatic.      Nose: Nose normal.      Mouth/Throat:      Mouth: Mucous membranes are moist.      Pharynx: Oropharynx is clear.   Eyes:      Extraocular Movements: Extraocular movements intact.      Conjunctiva/sclera: Conjunctivae normal.      Pupils: Pupils are equal, round, and reactive to light.   Cardiovascular:      Rate and Rhythm: Normal rate and regular rhythm.      Heart sounds: Normal heart sounds. No murmur heard.     No friction rub. No gallop. "   Pulmonary:      Effort: Pulmonary effort is normal.      Breath sounds: Normal breath sounds.   Musculoskeletal:         General: Normal range of motion.      Cervical back: Normal range of motion and neck supple.   Skin:     General: Skin is warm and dry.      Coloration: Skin is not jaundiced or pale.      Findings: No rash.   Neurological:      General: No focal deficit present.      Mental Status: He is alert and oriented to person, place, and time. Mental status is at baseline.   Psychiatric:         Mood and Affect: Mood normal.         Behavior: Behavior normal.         Thought Content: Thought content normal.         Judgment: Judgment normal.          Labs Reviewed:  Chemistry:  Lab Results   Component Value Date     07/22/2024    K 5.3 (H) 07/22/2024    BUN 12 07/22/2024    CREATININE 0.80 07/22/2024    EGFRNORACEVR >90 07/22/2024    GLUCOSE 87 07/22/2024    CALCIUM 9.8 07/22/2024    ALKPHOS 88 07/22/2024    LABPROT 7.5 07/22/2024    ALBUMIN 4.4 07/22/2024    AST 38 07/22/2024    ALT 30 07/22/2024    TSH 1.730 07/22/2024    PSA 0.87 07/22/2024        Lab Results   Component Value Date    HGBA1C 5.6 07/22/2024        Hematology:  Lab Results   Component Value Date    WBC 6.95 07/22/2024    RBC 5.46 07/22/2024    HGB 17.1 07/22/2024    HCT 49.9 07/22/2024    MCV 91.4 07/22/2024    MCH 31.3 07/22/2024    MCHC 34.3 07/22/2024    RDW 14.9 07/22/2024     07/22/2024    MPV 12.5 (H) 07/22/2024       Lipid Panel:  Lab Results   Component Value Date    CHOL 161 07/22/2024    HDL 61 (H) 07/22/2024    TRIG 122 07/22/2024        Assessment & Plan:  1. Primary hypertension  Uncontrolled. /88  Increase losartan to 50mg po daily.   Low Sodium Diet (DASH Diet - Less than 2 grams of sodium per day).  Monitor blood pressure daily and log. Report consistent numbers greater than 140/90.  Maintain healthy weight with goal BMI <30. Exercise 30 minutes per day, 5 days per week.  Smoking cessation encouraged  to aid in BP reduction.  BP log.     2. Tobacco dependence.   Smoking cessation.      Follow up in about 2 weeks (around 10/7/2024) for BP Check.   Return to the clinic as needed.    Future Appointments   Date Time Provider Department Center   10/7/2024  9:30 AM Lisha Chavez NP [facility]         Lab Frequency Next Occurrence   Ambulatory referral/consult to Physical/Occupational Therapy Once 07/29/2024   Ambulatory referral/consult to General Surgery Once 08/07/2024   Ambulatory referral/consult to Cardiology Once 09/22/2024   Ambulatory referral/consult to Orthopedics Once 09/22/2024        HENRY Jolley

## 2024-09-28 VITALS
HEIGHT: 66 IN | DIASTOLIC BLOOD PRESSURE: 88 MMHG | TEMPERATURE: 98 F | WEIGHT: 178 LBS | BODY MASS INDEX: 28.61 KG/M2 | OXYGEN SATURATION: 97 % | HEART RATE: 73 BPM | SYSTOLIC BLOOD PRESSURE: 142 MMHG

## 2024-09-30 ENCOUNTER — TELEPHONE (OUTPATIENT)
Dept: FAMILY MEDICINE | Facility: CLINIC | Age: 64
End: 2024-09-30
Payer: MEDICAID

## 2024-09-30 DIAGNOSIS — I10 PRIMARY HYPERTENSION: ICD-10-CM

## 2024-09-30 RX ORDER — LOSARTAN POTASSIUM 50 MG/1
50 TABLET ORAL DAILY
Qty: 30 TABLET | Refills: 2 | Status: SHIPPED | OUTPATIENT
Start: 2024-09-30

## 2024-09-30 NOTE — TELEPHONE ENCOUNTER
Per Lisha pt needs to take 50 mg po daily.       Pts partner is notified that I sent rx to [facility].

## 2024-10-07 ENCOUNTER — OFFICE VISIT (OUTPATIENT)
Dept: FAMILY MEDICINE | Facility: CLINIC | Age: 64
End: 2024-10-07
Payer: MEDICAID

## 2024-10-07 VITALS
HEART RATE: 89 BPM | DIASTOLIC BLOOD PRESSURE: 74 MMHG | BODY MASS INDEX: 29.09 KG/M2 | OXYGEN SATURATION: 97 % | WEIGHT: 181 LBS | TEMPERATURE: 98 F | SYSTOLIC BLOOD PRESSURE: 138 MMHG | HEIGHT: 66 IN

## 2024-10-07 DIAGNOSIS — I10 PRIMARY HYPERTENSION: Primary | ICD-10-CM

## 2024-10-07 PROCEDURE — 1160F RVW MEDS BY RX/DR IN RCRD: CPT | Mod: CPTII,,, | Performed by: NURSE PRACTITIONER

## 2024-10-07 PROCEDURE — G2211 COMPLEX E/M VISIT ADD ON: HCPCS | Mod: ,,, | Performed by: NURSE PRACTITIONER

## 2024-10-07 PROCEDURE — 1159F MED LIST DOCD IN RCRD: CPT | Mod: CPTII,,, | Performed by: NURSE PRACTITIONER

## 2024-10-07 PROCEDURE — 3075F SYST BP GE 130 - 139MM HG: CPT | Mod: CPTII,,, | Performed by: NURSE PRACTITIONER

## 2024-10-07 PROCEDURE — 3078F DIAST BP <80 MM HG: CPT | Mod: CPTII,,, | Performed by: NURSE PRACTITIONER

## 2024-10-07 PROCEDURE — 4010F ACE/ARB THERAPY RXD/TAKEN: CPT | Mod: CPTII,,, | Performed by: NURSE PRACTITIONER

## 2024-10-07 PROCEDURE — 99213 OFFICE O/P EST LOW 20 MIN: CPT | Mod: ,,, | Performed by: NURSE PRACTITIONER

## 2024-10-07 PROCEDURE — 3008F BODY MASS INDEX DOCD: CPT | Mod: CPTII,,, | Performed by: NURSE PRACTITIONER

## 2024-10-07 PROCEDURE — 3044F HG A1C LEVEL LT 7.0%: CPT | Mod: CPTII,,, | Performed by: NURSE PRACTITIONER

## 2024-10-07 NOTE — PROGRESS NOTES
"Patient ID: Tommy Ramirez  : 1960    Chief Complaint: Hypertension (Patient is here to recheck BP)    Allergies: Patient is allergic to penicillins.     History of Present Illness:  The patient presents to clinic for Hypertension (Patient is here to recheck BP)     Social History:  reports that he has been smoking cigarettes. He started smoking about 53 years ago. He has a 26.9 pack-year smoking history. He has never used smokeless tobacco. He reports current alcohol use of about 28.0 standard drinks of alcohol per week. He reports that he does not use drugs.    Past Medical History:  has a past medical history of Hypertension.    Surgical History: History reviewed. No pertinent surgical history.    Current Medications:  Current Outpatient Medications   Medication Instructions    diclofenac sodium (VOLTAREN ARTHRITIS PAIN) 2 g, Topical (Top), 4 times daily    losartan (COZAAR) 50 mg, Oral, Daily       Review of Systems   A comprehensive review of symptoms was completed and negative except as noted above.    Visit Vitals  /74 (Patient Position: Standing)   Pulse 89   Temp 98 °F (36.7 °C)   Ht 5' 6" (1.676 m)   Wt 82.1 kg (181 lb)   SpO2 97%   BMI 29.21 kg/m²       Physical Exam  Vitals and nursing note reviewed.   Constitutional:       General: He is not in acute distress.     Appearance: Normal appearance. He is not toxic-appearing.   HENT:      Head: Normocephalic and atraumatic.      Nose: Nose normal.      Mouth/Throat:      Mouth: Mucous membranes are moist.      Pharynx: Oropharynx is clear.   Eyes:      Extraocular Movements: Extraocular movements intact.      Conjunctiva/sclera: Conjunctivae normal.      Pupils: Pupils are equal, round, and reactive to light.   Cardiovascular:      Rate and Rhythm: Normal rate and regular rhythm.      Heart sounds: Normal heart sounds. No murmur heard.     No friction rub. No gallop.   Pulmonary:      Effort: Pulmonary effort is normal.      Breath sounds: " Normal breath sounds.   Musculoskeletal:         General: Normal range of motion.      Cervical back: Normal range of motion and neck supple.   Skin:     General: Skin is warm and dry.      Coloration: Skin is not jaundiced or pale.      Findings: No rash.   Neurological:      General: No focal deficit present.      Mental Status: He is alert and oriented to person, place, and time. Mental status is at baseline.   Psychiatric:         Mood and Affect: Mood normal.         Behavior: Behavior normal.         Thought Content: Thought content normal.         Judgment: Judgment normal.          Labs Reviewed:  Chemistry:  Lab Results   Component Value Date     07/22/2024    K 5.3 (H) 07/22/2024    BUN 12 07/22/2024    CREATININE 0.80 07/22/2024    EGFRNORACEVR >90 07/22/2024    GLUCOSE 87 07/22/2024    CALCIUM 9.8 07/22/2024    ALKPHOS 88 07/22/2024    LABPROT 7.5 07/22/2024    ALBUMIN 4.4 07/22/2024    AST 38 07/22/2024    ALT 30 07/22/2024    TSH 1.730 07/22/2024    PSA 0.87 07/22/2024        Lab Results   Component Value Date    HGBA1C 5.6 07/22/2024        Hematology:  Lab Results   Component Value Date    WBC 6.95 07/22/2024    RBC 5.46 07/22/2024    HGB 17.1 07/22/2024    HCT 49.9 07/22/2024    MCV 91.4 07/22/2024    MCH 31.3 07/22/2024    MCHC 34.3 07/22/2024    RDW 14.9 07/22/2024     07/22/2024    MPV 12.5 (H) 07/22/2024       Lipid Panel:  Lab Results   Component Value Date    CHOL 161 07/22/2024    HDL 61 (H) 07/22/2024    TRIG 122 07/22/2024        Assessment & Plan:  1. Primary hypertension  Well controlled.   Continue  Low Sodium Diet (DASH Diet - Less than 2 grams of sodium per day).  Monitor blood pressure daily and log. Report consistent numbers greater than 140/90.  Maintain healthy weight with goal BMI <30. Exercise 30 minutes per day, 5 days per week.  Smoking cessation encouraged to aid in BP reduction.       Follow up in about 3 months (around 1/7/2025) for BP Check, colonoscopy  pending.   Return to the clinic as needed.    Future Appointments   Date Time Provider Department Center   1/7/2025  3:00 PM Lisha Chavez NP [facility]       Lab Frequency Next Occurrence   Ambulatory referral/consult to Physical/Occupational Therapy Once 07/29/2024   Ambulatory referral/consult to General Surgery Once 08/07/2024   Ambulatory referral/consult to Cardiology Once 09/22/2024   Ambulatory referral/consult to Orthopedics Once 09/22/2024        HENRY Jolley

## 2024-12-22 DIAGNOSIS — I10 PRIMARY HYPERTENSION: ICD-10-CM

## 2024-12-23 ENCOUNTER — TELEPHONE (OUTPATIENT)
Dept: FAMILY MEDICINE | Facility: CLINIC | Age: 64
End: 2024-12-23
Payer: MEDICAID

## 2024-12-23 RX ORDER — LOSARTAN POTASSIUM 50 MG/1
50 TABLET ORAL
Qty: 30 TABLET | Refills: 0 | Status: SHIPPED | OUTPATIENT
Start: 2024-12-23

## 2024-12-23 NOTE — TELEPHONE ENCOUNTER
Patient aware refill was sent to [facility]   Retention Suture Text: Retention sutures were placed to support the closure and prevent dehiscence.

## 2025-01-07 ENCOUNTER — OFFICE VISIT (OUTPATIENT)
Dept: FAMILY MEDICINE | Facility: CLINIC | Age: 65
End: 2025-01-07
Payer: MEDICAID

## 2025-01-07 VITALS
OXYGEN SATURATION: 99 % | TEMPERATURE: 99 F | DIASTOLIC BLOOD PRESSURE: 72 MMHG | WEIGHT: 184 LBS | HEIGHT: 66 IN | HEART RATE: 86 BPM | SYSTOLIC BLOOD PRESSURE: 138 MMHG | BODY MASS INDEX: 29.57 KG/M2

## 2025-01-07 DIAGNOSIS — I10 PRIMARY HYPERTENSION: Primary | ICD-10-CM

## 2025-01-07 DIAGNOSIS — E78.00 HYPERCHOLESTEROLEMIA: ICD-10-CM

## 2025-01-07 DIAGNOSIS — F17.200 TOBACCO DEPENDENCE: ICD-10-CM

## 2025-01-07 PROCEDURE — 3075F SYST BP GE 130 - 139MM HG: CPT | Mod: CPTII,,, | Performed by: NURSE PRACTITIONER

## 2025-01-07 PROCEDURE — 1159F MED LIST DOCD IN RCRD: CPT | Mod: CPTII,,, | Performed by: NURSE PRACTITIONER

## 2025-01-07 PROCEDURE — 99214 OFFICE O/P EST MOD 30 MIN: CPT | Mod: ,,, | Performed by: NURSE PRACTITIONER

## 2025-01-07 PROCEDURE — 1160F RVW MEDS BY RX/DR IN RCRD: CPT | Mod: CPTII,,, | Performed by: NURSE PRACTITIONER

## 2025-01-07 PROCEDURE — 3008F BODY MASS INDEX DOCD: CPT | Mod: CPTII,,, | Performed by: NURSE PRACTITIONER

## 2025-01-07 PROCEDURE — G2211 COMPLEX E/M VISIT ADD ON: HCPCS | Mod: ,,, | Performed by: NURSE PRACTITIONER

## 2025-01-07 PROCEDURE — 4010F ACE/ARB THERAPY RXD/TAKEN: CPT | Mod: CPTII,,, | Performed by: NURSE PRACTITIONER

## 2025-01-07 PROCEDURE — 3078F DIAST BP <80 MM HG: CPT | Mod: CPTII,,, | Performed by: NURSE PRACTITIONER

## 2025-01-07 RX ORDER — ATORVASTATIN CALCIUM 20 MG/1
20 TABLET, FILM COATED ORAL DAILY
Qty: 30 TABLET | Refills: 2 | Status: SHIPPED | OUTPATIENT
Start: 2025-01-07

## 2025-01-07 RX ORDER — LOSARTAN POTASSIUM 100 MG/1
100 TABLET ORAL
COMMUNITY
Start: 2024-12-02 | End: 2025-01-07 | Stop reason: SDUPTHER

## 2025-01-07 RX ORDER — ATORVASTATIN CALCIUM 20 MG/1
20 TABLET, FILM COATED ORAL DAILY
COMMUNITY
Start: 2024-12-16 | End: 2025-01-07 | Stop reason: SDUPTHER

## 2025-01-07 RX ORDER — LOSARTAN POTASSIUM 100 MG/1
100 TABLET ORAL DAILY
Qty: 30 TABLET | Refills: 2 | Status: SHIPPED | OUTPATIENT
Start: 2025-01-07

## 2025-01-07 NOTE — PROGRESS NOTES
Patient ID: Tommy Ramirez  : 1960    Chief Complaint: Hypertension (BP follow up//Pt is schedule for colonoscopy the )    Allergies: Patient is allergic to penicillins.     History of Present Illness:  The patient presents to clinic for Hypertension (BP follow up//Pt is schedule for colonoscopy the )     Social History:  reports that he has been smoking cigarettes. He started smoking about 54 years ago. He has a 27 pack-year smoking history. He has never used smokeless tobacco. He reports current alcohol use of about 28.0 standard drinks of alcohol per week. He reports that he does not use drugs.    Past Medical History:  has a past medical history of Hypertension.    Surgical History: History reviewed. No pertinent surgical history.    Current Medications:  Current Outpatient Medications   Medication Instructions    atorvastatin (LIPITOR) 20 mg, Oral, Daily    diclofenac sodium (VOLTAREN ARTHRITIS PAIN) 2 g, Topical (Top), 4 times daily    losartan (COZAAR) 100 mg, Oral, Daily       Review of Systems   Constitutional:  Negative for activity change and unexpected weight change.   HENT:  Negative for hearing loss, rhinorrhea and trouble swallowing.    Eyes:  Negative for discharge and visual disturbance.   Respiratory:  Negative for chest tightness and wheezing.    Cardiovascular:  Negative for chest pain and palpitations.   Gastrointestinal:  Negative for blood in stool, constipation, diarrhea and vomiting.   Endocrine: Negative for polydipsia and polyuria.   Genitourinary:  Negative for difficulty urinating, hematuria and urgency.   Musculoskeletal:  Negative for arthralgias, joint swelling and neck pain.   Neurological:  Negative for weakness and headaches.   Psychiatric/Behavioral:  Negative for confusion and dysphoric mood.    All other systems reviewed and are negative.     A comprehensive review of symptoms was completed and negative except as noted above.    Visit Vitals  /72 (BP  "Location: Left arm, Patient Position: Sitting)   Pulse 86   Temp 98.6 °F (37 °C)   Ht 5' 6" (1.676 m)   Wt 83.5 kg (184 lb)   SpO2 99%   BMI 29.70 kg/m²       Physical Exam  Vitals and nursing note reviewed.   Constitutional:       General: He is not in acute distress.     Appearance: Normal appearance. He is not toxic-appearing.   HENT:      Head: Normocephalic and atraumatic.      Nose: Nose normal.      Mouth/Throat:      Mouth: Mucous membranes are moist.      Pharynx: Oropharynx is clear.   Eyes:      Extraocular Movements: Extraocular movements intact.      Conjunctiva/sclera: Conjunctivae normal.      Pupils: Pupils are equal, round, and reactive to light.   Cardiovascular:      Rate and Rhythm: Normal rate and regular rhythm.      Heart sounds: Normal heart sounds. No murmur heard.     No friction rub. No gallop.   Pulmonary:      Effort: Pulmonary effort is normal.      Breath sounds: Normal breath sounds.   Musculoskeletal:         General: Normal range of motion.      Cervical back: Normal range of motion and neck supple.   Skin:     General: Skin is warm and dry.      Coloration: Skin is not jaundiced or pale.      Findings: No rash.   Neurological:      General: No focal deficit present.      Mental Status: He is alert and oriented to person, place, and time. Mental status is at baseline.   Psychiatric:         Mood and Affect: Mood normal.         Behavior: Behavior normal.         Thought Content: Thought content normal.         Judgment: Judgment normal.          Labs Reviewed:  Chemistry:  Lab Results   Component Value Date     07/22/2024    K 5.3 (H) 07/22/2024    BUN 12 07/22/2024    CREATININE 0.80 07/22/2024    EGFRNORACEVR >90 07/22/2024    GLUCOSE 87 07/22/2024    CALCIUM 9.8 07/22/2024    ALKPHOS 88 07/22/2024    LABPROT 7.5 07/22/2024    ALBUMIN 4.4 07/22/2024    AST 38 07/22/2024    ALT 30 07/22/2024    TSH 1.730 07/22/2024    PSA 0.87 07/22/2024        Lab Results   Component Value " Date    HGBA1C 5.6 07/22/2024        Hematology:  Lab Results   Component Value Date    WBC 6.95 07/22/2024    RBC 5.46 07/22/2024    HGB 17.1 07/22/2024    HCT 49.9 07/22/2024    MCV 91.4 07/22/2024    MCH 31.3 07/22/2024    MCHC 34.3 07/22/2024    RDW 14.9 07/22/2024     07/22/2024    MPV 12.5 (H) 07/22/2024       Lipid Panel:  Lab Results   Component Value Date    CHOL 161 07/22/2024    HDL 61 (H) 07/22/2024    TRIG 122 07/22/2024        No results found for this or any previous visit (from the past 24 hours).    Assessment & Plan:  1. Primary hypertension.  /72  Controlled. Continue losartan 100mg po daily.   Smoking cessation encouraged to aid in BP reduction.  - refill    losartan (COZAAR) 100 MG tablet; Take 1 tablet (100 mg total) by mouth once daily.  Dispense: 30 tablet; Refill: 2    2. Hypercholesterolemia  Last FLP 7/22/2024.   -  refill   atorvastatin (LIPITOR) 20 MG tablet; Take 1 tablet (20 mg total) by mouth once daily.  Dispense: 30 tablet; Refill: 2    3. Tobacco dependence  Smoking cessation.        Follow up in about 3 months (around 4/7/2025) for CMP at appt, BP Check. .   Colonoscopy pending 1/17/2025 with Dr Mccormick.   Return to the clinic as needed.    Future Appointments   Date Time Provider Department Center   1/13/2025  8:30 AM PRE-ADMIT, [facility]   1/17/2025 11:00 AM [facility] GI [facility]   4/8/2025  3:30 PM Lisha Chavez NP [facility]       Lab Frequency Next Occurrence   Ambulatory referral/consult to Physical/Occupational Therapy Once 07/29/2024   Ambulatory referral/consult to General Surgery Once 08/07/2024   Ambulatory referral/consult to Cardiology Once 09/22/2024   Ambulatory referral/consult to Orthopedics Once 09/22/2024   Colonoscopy Once 12/26/2024        HENRY Jolley

## 2025-01-13 ENCOUNTER — HOSPITAL ENCOUNTER (OUTPATIENT)
Dept: RADIOLOGY | Facility: HOSPITAL | Age: 65
Discharge: HOME | End: 2025-01-13
Attending: SURGERY
Payer: MEDICAID

## 2025-01-13 ENCOUNTER — HOSPITAL ENCOUNTER (OUTPATIENT)
Dept: PREADMISSION TESTING | Facility: HOSPITAL | Age: 65
Discharge: HOME | End: 2025-01-13
Attending: SURGERY
Payer: MEDICAID

## 2025-01-13 DIAGNOSIS — Z12.11 SCREEN FOR COLON CANCER: Primary | ICD-10-CM

## 2025-01-13 DIAGNOSIS — Z12.11 SCREEN FOR COLON CANCER: ICD-10-CM

## 2025-01-13 PROCEDURE — 93005 ELECTROCARDIOGRAM TRACING: CPT

## 2025-01-13 PROCEDURE — 71046 X-RAY EXAM CHEST 2 VIEWS: CPT | Mod: TC

## 2025-01-13 PROCEDURE — 93010 ELECTROCARDIOGRAM REPORT: CPT | Mod: ,,, | Performed by: INTERNAL MEDICINE

## 2025-01-13 RX ORDER — ASPIRIN 81 MG/1
81 TABLET ORAL DAILY
COMMUNITY

## 2025-01-13 NOTE — DISCHARGE INSTRUCTIONS

## 2025-01-14 LAB
OHS QRS DURATION: 122 MS
OHS QTC CALCULATION: 459 MS

## 2025-01-16 ENCOUNTER — ANESTHESIA EVENT (OUTPATIENT)
Dept: GASTROENTEROLOGY | Facility: HOSPITAL | Age: 65
End: 2025-01-16
Payer: MEDICAID

## 2025-01-16 NOTE — ANESTHESIA PREPROCEDURE EVALUATION
01/16/2025  Tommy Ramirez is a 64 y.o., male.      Pre-op Assessment    I have reviewed the Patient Summary Reports.     I have reviewed the Nursing Notes. I have reviewed the NPO Status.   I have reviewed the Medications.     Review of Systems  Anesthesia Hx:  No problems with previous Anesthesia             Denies Family Hx of Anesthesia complications.    Denies Personal Hx of Anesthesia complications.                    Social:  Smoker       Hematology/Oncology:  Hematology Normal   Oncology Normal                                   EENT/Dental:  EENT/Dental Normal           Cardiovascular:  Exercise tolerance: good   Hypertension, well controlled                                    Hypertension, Essential Hypertension         Pulmonary:  Pulmonary Normal                       Renal/:  Renal/ Normal                 Hepatic/GI:  Hepatic/GI Normal                    Musculoskeletal:  Arthritis   Arthritis lower back and shoulders per patient            Neurological:  Neurology Normal                                      Endocrine:  Endocrine Normal            Dermatological:  Skin Normal    Psych:  Psychiatric Normal                    Physical Exam  General: Well nourished, Cooperative, Alert and Oriented    Airway:  Mallampati: II / II  Mouth Opening: Normal  TM Distance: Normal  Tongue: Normal  Neck ROM: Normal ROM    Dental:  Intact    Chest/Lungs:  Clear to auscultation, Normal Respiratory Rate    Heart:  Rate: Normal  Rhythm: Regular Rhythm  Sounds: Normal        Anesthesia Plan  Type of Anesthesia, risks & benefits discussed:    Anesthesia Type: MAC  Intra-op Monitoring Plan: Standard ASA Monitors  Post Op Pain Control Plan: multimodal analgesia  Induction:  IV  Airway Plan: Direct  Informed Consent: Informed consent signed with the Patient and all parties understand the risks and agree with  anesthesia plan.  All questions answered. Patient consented to blood products? Yes  ASA Score: 3  Day of Surgery Review of History & Physical: H&P Update referred to the surgeon/provider.I have interviewed and examined the patient. I have reviewed the patient's H&P dated: There are no significant changes.     Ready For Surgery From Anesthesia Perspective.     .

## 2025-01-17 ENCOUNTER — ANESTHESIA (OUTPATIENT)
Dept: GASTROENTEROLOGY | Facility: HOSPITAL | Age: 65
End: 2025-01-17
Payer: MEDICAID

## 2025-01-17 ENCOUNTER — HOSPITAL ENCOUNTER (OUTPATIENT)
Dept: GASTROENTEROLOGY | Facility: HOSPITAL | Age: 65
Discharge: HOME | End: 2025-01-17
Attending: SURGERY
Payer: MEDICAID

## 2025-01-17 VITALS
OXYGEN SATURATION: 99 % | SYSTOLIC BLOOD PRESSURE: 157 MMHG | RESPIRATION RATE: 20 BRPM | WEIGHT: 184.06 LBS | BODY MASS INDEX: 29.58 KG/M2 | HEIGHT: 66 IN | DIASTOLIC BLOOD PRESSURE: 85 MMHG | TEMPERATURE: 97 F | HEART RATE: 77 BPM

## 2025-01-17 DIAGNOSIS — Z80.0 FAMILY HISTORY OF MALIGNANT NEOPLASM OF GASTROINTESTINAL TRACT: ICD-10-CM

## 2025-01-17 DIAGNOSIS — Z12.11 SCREEN FOR COLON CANCER: ICD-10-CM

## 2025-01-17 PROCEDURE — G0105 COLORECTAL SCRN; HI RISK IND: HCPCS | Performed by: SURGERY

## 2025-01-17 PROCEDURE — 37000008 HC ANESTHESIA 1ST 15 MINUTES

## 2025-01-17 PROCEDURE — 25000003 PHARM REV CODE 250: Performed by: SURGERY

## 2025-01-17 PROCEDURE — 63600175 PHARM REV CODE 636 W HCPCS: Mod: JZ,TB | Performed by: NURSE ANESTHETIST, CERTIFIED REGISTERED

## 2025-01-17 PROCEDURE — 37000009 HC ANESTHESIA EA ADD 15 MINS

## 2025-01-17 PROCEDURE — D9220A PRA ANESTHESIA: Mod: ,,, | Performed by: NURSE ANESTHETIST, CERTIFIED REGISTERED

## 2025-01-17 RX ORDER — DEXTROMETHORPHAN/PSEUDOEPHED 2.5-7.5/.8
DROPS ORAL
Status: COMPLETED | OUTPATIENT
Start: 2025-01-17 | End: 2025-01-17

## 2025-01-17 RX ORDER — GLYCOPYRROLATE 0.2 MG/ML
INJECTION INTRAMUSCULAR; INTRAVENOUS
Status: DISCONTINUED | OUTPATIENT
Start: 2025-01-17 | End: 2025-01-17

## 2025-01-17 RX ORDER — PROPOFOL 10 MG/ML
VIAL (ML) INTRAVENOUS
Status: DISCONTINUED | OUTPATIENT
Start: 2025-01-17 | End: 2025-01-17

## 2025-01-17 RX ORDER — LIDOCAINE HYDROCHLORIDE 20 MG/ML
INJECTION INTRAVENOUS
Status: DISCONTINUED | OUTPATIENT
Start: 2025-01-17 | End: 2025-01-17

## 2025-01-17 RX ADMIN — LIDOCAINE HYDROCHLORIDE 50 MG: 20 INJECTION, SOLUTION INTRAVENOUS at 12:01

## 2025-01-17 RX ADMIN — PROPOFOL 30 MG: 10 INJECTION, EMULSION INTRAVENOUS at 12:01

## 2025-01-17 RX ADMIN — SIMETHICONE 20 MG: 20 SUSPENSION/ DROPS ORAL at 12:01

## 2025-01-17 RX ADMIN — PROPOFOL 70 MG: 10 INJECTION, EMULSION INTRAVENOUS at 12:01

## 2025-01-17 RX ADMIN — GLYCOPYRROLATE 0.2 MG: 0.2 INJECTION INTRAMUSCULAR; INTRAVENOUS at 12:01

## 2025-01-17 NOTE — INTERVAL H&P NOTE
The patient has been examined and the H&P has been reviewed:    I concur with the findings and no changes have occurred since H&P was written.        There are no hospital problems to display for this patient.  AMRIT Thompson RN present for exam.

## 2025-01-17 NOTE — DISCHARGE INSTRUCTIONS
Follow-up with Dr TERAN   Diet: as tolerated  Activity:  decrease activity today, no driving today, resume all activity tomorrow.  Notify MD:  increased swelling of abdomen, excessive nausea/vomiting, excessive bright red bleeding from rectum.  Medications:  continue your home medications. Keep a list of your home medications at all times for emergencies.

## 2025-01-17 NOTE — PLAN OF CARE
Patient went to the restroom, Bowel movement was visualized by JANAK Cardenas RN, Clear liquid with bright red blood noted

## 2025-01-17 NOTE — ANESTHESIA POSTPROCEDURE EVALUATION
Anesthesia Post Evaluation    Patient: Tommy Ramirez    Procedure(s) Performed: * No procedures listed *    Final Anesthesia Type: MAC      Patient location during evaluation: GI PACU  Patient participation: Yes- Able to Participate  Level of consciousness: awake and alert, awake and oriented  Post-procedure vital signs: reviewed and stable  Pain management: adequate  Airway patency: patent    PONV status at discharge: No PONV  Anesthetic complications: no      Cardiovascular status: blood pressure returned to baseline  Respiratory status: unassisted, room air and spontaneous ventilation  Hydration status: euvolemic  Follow-up not needed.              Vitals Value Taken Time   /82 01/17/25 0845   Temp 36.3 °C (97.4 °F) 01/17/25 0845   Pulse 75 01/17/25 0845   Resp 20 01/17/25 0845   SpO2 98 % 01/17/25 0845         No case tracking events are documented in the log.      Pain/Mamta Score: No data recorded

## 2025-01-23 PROBLEM — Z98.890 HX OF COLONOSCOPY: Status: ACTIVE | Noted: 2025-01-23

## 2025-01-23 NOTE — DISCHARGE SUMMARY
[facility]  [facility]    Colonoscopy      OUTCOME: Patient tolerated treatment/procedure well without complication and is now ready for discharge.    DISPOSITION: Home or Self Care    FINAL DIAGNOSIS:  <principal problem not specified>    FOLLOWUP: In clinic    DISCHARGE INSTRUCTIONS:  No discharge procedures on file.      Clinical Reference Documents Added to Patient Instructions         Document    COLONOSCOPY (ENGLISH)    [facility] DISCHARGE INSTRUCTIONS (ENGLISH)            TIME SPENT ON DISCHARGE: 5 minutes

## 2025-02-10 ENCOUNTER — OFFICE VISIT (OUTPATIENT)
Dept: FAMILY MEDICINE | Facility: CLINIC | Age: 65
End: 2025-02-10
Payer: MEDICAID

## 2025-02-10 VITALS
WEIGHT: 190 LBS | HEART RATE: 94 BPM | OXYGEN SATURATION: 96 % | BODY MASS INDEX: 30.53 KG/M2 | DIASTOLIC BLOOD PRESSURE: 72 MMHG | SYSTOLIC BLOOD PRESSURE: 136 MMHG | TEMPERATURE: 97 F | HEIGHT: 66 IN

## 2025-02-10 DIAGNOSIS — J01.00 ACUTE NON-RECURRENT MAXILLARY SINUSITIS: Primary | ICD-10-CM

## 2025-02-10 PROCEDURE — 3075F SYST BP GE 130 - 139MM HG: CPT | Mod: CPTII,,, | Performed by: NURSE PRACTITIONER

## 2025-02-10 PROCEDURE — 99213 OFFICE O/P EST LOW 20 MIN: CPT | Mod: ,,, | Performed by: NURSE PRACTITIONER

## 2025-02-10 PROCEDURE — 1159F MED LIST DOCD IN RCRD: CPT | Mod: CPTII,,, | Performed by: NURSE PRACTITIONER

## 2025-02-10 PROCEDURE — 3078F DIAST BP <80 MM HG: CPT | Mod: CPTII,,, | Performed by: NURSE PRACTITIONER

## 2025-02-10 PROCEDURE — 1160F RVW MEDS BY RX/DR IN RCRD: CPT | Mod: CPTII,,, | Performed by: NURSE PRACTITIONER

## 2025-02-10 PROCEDURE — 3008F BODY MASS INDEX DOCD: CPT | Mod: CPTII,,, | Performed by: NURSE PRACTITIONER

## 2025-02-10 PROCEDURE — 4010F ACE/ARB THERAPY RXD/TAKEN: CPT | Mod: CPTII,,, | Performed by: NURSE PRACTITIONER

## 2025-02-10 RX ORDER — DEXAMETHASONE SODIUM PHOSPHATE 4 MG/ML
8 INJECTION, SOLUTION INTRA-ARTICULAR; INTRALESIONAL; INTRAMUSCULAR; INTRAVENOUS; SOFT TISSUE
Status: COMPLETED | OUTPATIENT
Start: 2025-02-10 | End: 2025-02-10

## 2025-02-10 RX ORDER — ALBUTEROL SULFATE 90 UG/1
2 INHALANT RESPIRATORY (INHALATION) EVERY 6 HOURS PRN
Qty: 18 G | Refills: 0 | Status: SHIPPED | OUTPATIENT
Start: 2025-02-10 | End: 2026-02-10

## 2025-02-10 RX ORDER — FEXOFENADINE HCL AND PSEUDOEPHEDRINE HCI 60; 120 MG/1; MG/1
1 TABLET, EXTENDED RELEASE ORAL 2 TIMES DAILY
Qty: 20 TABLET | Refills: 0 | Status: SHIPPED | OUTPATIENT
Start: 2025-02-10 | End: 2025-02-20

## 2025-02-10 RX ORDER — FLUTICASONE PROPIONATE 50 MCG
1 SPRAY, SUSPENSION (ML) NASAL DAILY
Qty: 15.8 ML | Refills: 0 | Status: SHIPPED | OUTPATIENT
Start: 2025-02-10

## 2025-02-10 RX ORDER — BENZONATATE 200 MG/1
200 CAPSULE ORAL 2 TIMES DAILY PRN
Qty: 30 CAPSULE | Refills: 1 | Status: SHIPPED | OUTPATIENT
Start: 2025-02-10

## 2025-02-10 RX ADMIN — DEXAMETHASONE SODIUM PHOSPHATE 8 MG: 4 INJECTION, SOLUTION INTRA-ARTICULAR; INTRALESIONAL; INTRAMUSCULAR; INTRAVENOUS; SOFT TISSUE at 11:02

## 2025-02-11 ENCOUNTER — TELEPHONE (OUTPATIENT)
Dept: FAMILY MEDICINE | Facility: CLINIC | Age: 65
End: 2025-02-11
Payer: MEDICAID

## 2025-02-11 NOTE — PROGRESS NOTES
Patient ID: Tommy Ramirez  : 1960    Chief Complaint: Nasal Congestion (X 1 week. )    Allergies: Patient is allergic to penicillins.     History of Present Illness    Patient presents today with cough and respiratory symptoms    HISTORY OF PRESENT ILLNESS:  He reports cough starting 1.5-2 weeks ago with associated throat symptoms and gagging. He also notes shortness of breath. He denies fever, chills, or sleep disturbance from the cough. He declined swab testing.    MEDICAL HISTORY:  He has previously declined cardiology and orthopedic referrals.      ROS:  General: -fever, -chills, -fatigue, -weight gain, -weight loss  Eyes: -vision changes, -redness, -discharge  ENT: -ear pain, -nasal congestion, -sore throat  Cardiovascular: -chest pain, -palpitations, -lower extremity edema  Respiratory: +cough, +shortness of breath  Gastrointestinal: -abdominal pain, -nausea, -vomiting, -diarrhea, -constipation, -blood in stool  Genitourinary: -dysuria, -hematuria, -frequency  Musculoskeletal: -joint pain, -muscle pain  Skin: -rash, -lesion  Neurological: -headache, -dizziness, -numbness, -tingling  Psychiatric: -anxiety, -depression, -sleep difficulty          Social History:  reports that he has been smoking cigarettes. He has been exposed to tobacco smoke. He has never used smokeless tobacco. He reports current alcohol use of about 28.0 standard drinks of alcohol per week. He reports that he does not use drugs.    Past Medical History:  has a past medical history of High cholesterol and Hypertension.    Surgical History: History reviewed. No pertinent surgical history.    Current Medications:  Current Outpatient Medications   Medication Instructions    albuterol (VENTOLIN HFA) 90 mcg/actuation inhaler 2 puffs, Inhalation, Every 6 hours PRN, Rescue    aspirin (ECOTRIN) 81 mg, Daily    atorvastatin (LIPITOR) 20 mg, Oral, Daily    benzonatate (TESSALON) 200 mg, Oral, 2 times daily PRN    diclofenac sodium (VOLTAREN  "ARTHRITIS PAIN) 2 g, Topical (Top), 4 times daily    fexofenadine-pseudoephedrine  mg (ALLEGRA-D)  mg per tablet 1 tablet, Oral, 2 times daily    fluticasone propionate (FLONASE) 50 mcg, Each Nostril, Daily    losartan (COZAAR) 100 mg, Oral, Daily       Review of Systems   A comprehensive review of symptoms was completed and negative except as noted above.    Visit Vitals  /72 (Patient Position: Sitting)   Pulse 94   Temp 97.2 °F (36.2 °C)   Ht 5' 6" (1.676 m)   Wt 86.2 kg (190 lb)   SpO2 96%   BMI 30.67 kg/m²       Physical Exam  Vitals and nursing note reviewed.   Constitutional:       General: He is not in acute distress.     Appearance: Normal appearance. He is not ill-appearing or toxic-appearing.   HENT:      Head: Normocephalic and atraumatic.      Right Ear: Tympanic membrane, ear canal and external ear normal.      Left Ear: Tympanic membrane, ear canal and external ear normal.      Nose: Congestion and rhinorrhea present.      Mouth/Throat:      Mouth: Mucous membranes are moist.      Pharynx: Oropharynx is clear. No oropharyngeal exudate or posterior oropharyngeal erythema.   Eyes:      Extraocular Movements: Extraocular movements intact.      Conjunctiva/sclera: Conjunctivae normal.      Pupils: Pupils are equal, round, and reactive to light.   Cardiovascular:      Rate and Rhythm: Normal rate and regular rhythm.      Heart sounds: Normal heart sounds. No murmur heard.     No friction rub. No gallop.   Pulmonary:      Effort: Pulmonary effort is normal.      Breath sounds: Wheezing (mild, expiratory) present.   Musculoskeletal:         General: Normal range of motion.      Cervical back: Normal range of motion and neck supple.   Lymphadenopathy:      Cervical: No cervical adenopathy.   Skin:     General: Skin is warm and dry.      Coloration: Skin is not jaundiced or pale.      Findings: No rash.   Neurological:      General: No focal deficit present.      Mental Status: He is alert and " oriented to person, place, and time. Mental status is at baseline.   Psychiatric:         Mood and Affect: Mood normal.         Behavior: Behavior normal.         Thought Content: Thought content normal.         Judgment: Judgment normal.              Labs Reviewed:  Chemistry:  Lab Results   Component Value Date     07/22/2024    K 5.3 (H) 07/22/2024    BUN 12 07/22/2024    CREATININE 0.80 07/22/2024    EGFRNORACEVR >90 07/22/2024    GLUCOSE 87 07/22/2024    CALCIUM 9.8 07/22/2024    ALKPHOS 88 07/22/2024    LABPROT 7.5 07/22/2024    ALBUMIN 4.4 07/22/2024    AST 38 07/22/2024    ALT 30 07/22/2024    TSH 1.730 07/22/2024    PSA 0.87 07/22/2024        Lab Results   Component Value Date    HGBA1C 5.6 07/22/2024        Hematology:  Lab Results   Component Value Date    WBC 6.95 07/22/2024    RBC 5.46 07/22/2024    HGB 17.1 07/22/2024    HCT 49.9 07/22/2024    MCV 91.4 07/22/2024    MCH 31.3 07/22/2024    MCHC 34.3 07/22/2024    RDW 14.9 07/22/2024     07/22/2024    MPV 12.5 (H) 07/22/2024       Lipid Panel:  Lab Results   Component Value Date    CHOL 161 07/22/2024    HDL 61 (H) 07/22/2024    TRIG 122 07/22/2024        No results found for this or any previous visit (from the past 24 hours).    Assessment & Plan:  1. Acute non-recurrent maxillary sinusitis  -     dexAMETHasone injection 8 mg  -     benzonatate (TESSALON) 200 MG capsule; Take 1 capsule (200 mg total) by mouth 2 (two) times daily as needed for Cough.  Dispense: 30 capsule; Refill: 1  -     albuterol (VENTOLIN HFA) 90 mcg/actuation inhaler; Inhale 2 puffs into the lungs every 6 (six) hours as needed for Wheezing. Rescue  Dispense: 18 g; Refill: 0  -     fexofenadine-pseudoephedrine  mg (ALLEGRA-D)  mg per tablet; Take 1 tablet by mouth 2 (two) times daily. for 10 days  Dispense: 20 tablet; Refill: 0  -     fluticasone propionate (FLONASE) 50 mcg/actuation nasal spray; 1 spray (50 mcg total) by Each Nostril route once daily.   Dispense: 15.8 mL; Refill: 0       Assessment & Plan    IMPRESSION:  - Assessed patient presenting with persistent cough for 1.5-2 weeks, sore throat, ear pain, and mild difficulty breathing  - Observed slight wheezing on lung exam, but overall lung sounds good  - Determined no need for antibiotics at this time  - Opted for symptomatic treatment with combination of antihistamine, nasal spray, bronchodilator, and cough suppressant  - Considered patient's preference against steroid injection    COUGH:  - Prescribed Tessalon Perles as needed for cough.  - Patient has been coughing for approximately 1.5 to 2 weeks, experiencing difficulty sleeping due to the cough.  - Advised the patient to maintain adequate hydration.  - Explained importance of adequate fluid intake.  - Patient to drink plenty of fluids.  - Declined viral swab testing in clinic.      WHEEZING:  - Performed a physical exam and detected a slight wheeze.  - Prescribed albuterol as needed for wheezing.    THROAT IRRITATION:  - Patient reports gagging sensation and soreness in the throat.  - Performed a throat exam.  - Prescribed medication to reduce nasal and throat secretions.    DYSPNEA:  - Patient reports dyspnea.  - Performed a lung exam.    OTHER INSTRUCTIONS:  - Assessed that antibiotics were not necessary at this time.          Follow up if symptoms worsen or fail to improve.   Return to the clinic as needed.    Future Appointments   Date Time Provider Department Center   4/8/2025  3:30 PM Lisha Chavez NP [facility]       Lab Frequency Next Occurrence   Ambulatory referral/consult to Physical/Occupational Therapy Once 07/29/2024   Ambulatory referral/consult to General Surgery Once 08/07/2024   Ambulatory referral/consult to Cardiology Once 09/22/2024   Ambulatory referral/consult to Orthopedics Once 09/22/2024        This note was generated with the assistance of ambient listening technology. Verbal consent was obtained by the patient  and accompanying visitor(s) for the recording of patient appointment to facilitate this note. I attest to having reviewed and edited the generated note for accuracy, though some syntax or spelling errors may persist. Please contact the author of this note for any clarification.          Lisha Chavez, LARRYC

## 2025-03-12 DIAGNOSIS — T78.40XA ALLERGY, INITIAL ENCOUNTER: ICD-10-CM

## 2025-03-12 DIAGNOSIS — R06.02 SHORTNESS OF BREATH: ICD-10-CM

## 2025-03-12 DIAGNOSIS — R05.3 CHRONIC COUGH: ICD-10-CM

## 2025-03-12 DIAGNOSIS — J43.1 PANLOBULAR EMPHYSEMA: Primary | ICD-10-CM

## 2025-03-12 DIAGNOSIS — J01.00 ACUTE NON-RECURRENT MAXILLARY SINUSITIS: ICD-10-CM

## 2025-03-12 RX ORDER — MONTELUKAST SODIUM 10 MG/1
10 TABLET ORAL NIGHTLY
Qty: 30 TABLET | Refills: 2 | Status: SHIPPED | OUTPATIENT
Start: 2025-03-12

## 2025-03-12 RX ORDER — DOXYCYCLINE 100 MG/1
100 CAPSULE ORAL EVERY 12 HOURS
Qty: 14 CAPSULE | Refills: 0 | Status: SHIPPED | OUTPATIENT
Start: 2025-03-12

## 2025-03-12 RX ORDER — PREDNISONE 20 MG/1
40 TABLET ORAL DAILY
Qty: 10 TABLET | Refills: 0 | Status: SHIPPED | OUTPATIENT
Start: 2025-03-12 | End: 2025-03-17

## 2025-03-12 RX ORDER — ALBUTEROL SULFATE 90 UG/1
2 INHALANT RESPIRATORY (INHALATION) EVERY 6 HOURS PRN
Qty: 18 G | Refills: 0 | Status: SHIPPED | OUTPATIENT
Start: 2025-03-12 | End: 2026-03-12

## 2025-03-12 RX ORDER — BUDESONIDE AND FORMOTEROL FUMARATE DIHYDRATE 160; 4.5 UG/1; UG/1
2 AEROSOL RESPIRATORY (INHALATION) EVERY 12 HOURS
Qty: 10.2 G | Refills: 2 | Status: SHIPPED | OUTPATIENT
Start: 2025-03-12 | End: 2026-03-12

## 2025-03-12 NOTE — TELEPHONE ENCOUNTER
I sent out meds to pharmacy- refill albuterol, start Symbicort, start singular, short coarse of steroids, course of abx. . Outpatient CXR to complete at hospital- no appt necessary. PFT ordered - they will contact patient to schedule.

## 2025-03-26 ENCOUNTER — TELEPHONE (OUTPATIENT)
Dept: FAMILY MEDICINE | Facility: CLINIC | Age: 65
End: 2025-03-26
Payer: MEDICAID

## 2025-03-26 DIAGNOSIS — I10 PRIMARY HYPERTENSION: ICD-10-CM

## 2025-03-26 PROBLEM — J43.1 PANLOBULAR EMPHYSEMA: Status: ACTIVE | Noted: 2025-03-26

## 2025-03-26 RX ORDER — LOSARTAN POTASSIUM 100 MG/1
100 TABLET ORAL DAILY
Qty: 30 TABLET | Refills: 1 | Status: SHIPPED | OUTPATIENT
Start: 2025-03-26

## 2025-04-08 ENCOUNTER — OFFICE VISIT (OUTPATIENT)
Dept: FAMILY MEDICINE | Facility: CLINIC | Age: 65
End: 2025-04-08
Payer: MEDICAID

## 2025-04-08 VITALS
HEART RATE: 79 BPM | HEIGHT: 66 IN | WEIGHT: 191.38 LBS | DIASTOLIC BLOOD PRESSURE: 80 MMHG | BODY MASS INDEX: 30.76 KG/M2 | TEMPERATURE: 98 F | OXYGEN SATURATION: 97 % | SYSTOLIC BLOOD PRESSURE: 148 MMHG

## 2025-04-08 DIAGNOSIS — E78.00 HYPERCHOLESTEROLEMIA: ICD-10-CM

## 2025-04-08 DIAGNOSIS — J30.89 ENVIRONMENTAL AND SEASONAL ALLERGIES: ICD-10-CM

## 2025-04-08 DIAGNOSIS — J43.1 PANLOBULAR EMPHYSEMA: ICD-10-CM

## 2025-04-08 DIAGNOSIS — I10 PRIMARY HYPERTENSION: Primary | ICD-10-CM

## 2025-04-08 PROCEDURE — 1160F RVW MEDS BY RX/DR IN RCRD: CPT | Mod: CPTII,,, | Performed by: NURSE PRACTITIONER

## 2025-04-08 PROCEDURE — 99214 OFFICE O/P EST MOD 30 MIN: CPT | Mod: ,,, | Performed by: NURSE PRACTITIONER

## 2025-04-08 PROCEDURE — 3077F SYST BP >= 140 MM HG: CPT | Mod: CPTII,,, | Performed by: NURSE PRACTITIONER

## 2025-04-08 PROCEDURE — 3008F BODY MASS INDEX DOCD: CPT | Mod: CPTII,,, | Performed by: NURSE PRACTITIONER

## 2025-04-08 PROCEDURE — G2211 COMPLEX E/M VISIT ADD ON: HCPCS | Mod: ,,, | Performed by: NURSE PRACTITIONER

## 2025-04-08 PROCEDURE — 1159F MED LIST DOCD IN RCRD: CPT | Mod: CPTII,,, | Performed by: NURSE PRACTITIONER

## 2025-04-08 PROCEDURE — 80053 COMPREHEN METABOLIC PANEL: CPT | Performed by: NURSE PRACTITIONER

## 2025-04-08 PROCEDURE — 4010F ACE/ARB THERAPY RXD/TAKEN: CPT | Mod: CPTII,,, | Performed by: NURSE PRACTITIONER

## 2025-04-08 PROCEDURE — 3079F DIAST BP 80-89 MM HG: CPT | Mod: CPTII,,, | Performed by: NURSE PRACTITIONER

## 2025-04-08 RX ORDER — AMLODIPINE BESYLATE 2.5 MG/1
2.5 TABLET ORAL DAILY
Qty: 90 TABLET | Refills: 3 | Status: SHIPPED | OUTPATIENT
Start: 2025-04-08

## 2025-04-09 PROBLEM — J30.89 ENVIRONMENTAL AND SEASONAL ALLERGIES: Status: ACTIVE | Noted: 2025-04-09

## 2025-04-09 LAB
ALBUMIN SERPL-MCNC: 4.4 G/DL (ref 3.4–5)
ALBUMIN/GLOB SERPL: 1.6 RATIO
ALP SERPL-CCNC: 68 UNIT/L (ref 50–144)
ALT SERPL-CCNC: 53 UNIT/L (ref 1–45)
ANION GAP SERPL CALC-SCNC: 8 MEQ/L (ref 2–13)
AST SERPL-CCNC: 54 UNIT/L (ref 17–59)
BILIRUB SERPL-MCNC: 0.5 MG/DL (ref 0–1)
BUN SERPL-MCNC: 20 MG/DL (ref 7–20)
CALCIUM SERPL-MCNC: 8.9 MG/DL (ref 8.4–10.2)
CHLORIDE SERPL-SCNC: 107 MMOL/L (ref 98–110)
CO2 SERPL-SCNC: 26 MMOL/L (ref 21–32)
CREAT SERPL-MCNC: 1.04 MG/DL (ref 0.66–1.25)
CREAT/UREA NIT SERPL: 19 (ref 12–20)
GFR SERPLBLD CREATININE-BSD FMLA CKD-EPI: 81 ML/MIN/1.73/M2
GLOBULIN SER-MCNC: 2.7 GM/DL (ref 2–3.9)
GLUCOSE SERPL-MCNC: 78 MG/DL (ref 70–115)
POTASSIUM SERPL-SCNC: 5 MMOL/L (ref 3.5–5.1)
PROT SERPL-MCNC: 7.1 GM/DL (ref 6.3–8.2)
SODIUM SERPL-SCNC: 141 MMOL/L (ref 136–145)

## 2025-04-09 NOTE — PROGRESS NOTES
Patient ID: Tommy Ramirez  : 1960    Chief Complaint: Follow-up (Labs and b/p check)    Allergies: Patient is allergic to penicillins.     History of Present Illness    Patient presents today for three month follow up of recurrent sinusitis    MEDICATIONS:  He takes blood pressure medication daily. He reports taking prescribed sinus medication with mild symptom relief, though noting the effects wear off more rapidly than indicated on prescription label.      ROS:  General: -fever, -chills, -fatigue, -weight gain, -weight loss  Eyes: -vision changes, -redness, -discharge  ENT: -ear pain, -nasal congestion, -sore throat, +sinus pressure  Cardiovascular: -chest pain, -palpitations, -lower extremity edema  Respiratory: -cough, -shortness of breath  Gastrointestinal: -abdominal pain, -nausea, -vomiting, -diarrhea, -constipation, -blood in stool  Genitourinary: -dysuria, -hematuria, -frequency  Musculoskeletal: -joint pain, -muscle pain  Skin: -rash, -lesion  Neurological: -headache, -dizziness, -numbness, -tingling  Psychiatric: -anxiety, -depression, -sleep difficulty          Social History:  reports that he has been smoking cigarettes. He has been exposed to tobacco smoke. He has never used smokeless tobacco. He reports current alcohol use of about 28.0 standard drinks of alcohol per week. He reports that he does not use drugs.    Past Medical History:  has a past medical history of High cholesterol and Hypertension.    Surgical History: History reviewed. No pertinent surgical history.    Current Medications:  Current Outpatient Medications   Medication Instructions    albuterol (VENTOLIN HFA) 90 mcg/actuation inhaler 2 puffs, Inhalation, Every 6 hours PRN, Rescue    amLODIPine (NORVASC) 2.5 mg, Oral, Daily    aspirin (ECOTRIN) 81 mg, Daily    atorvastatin (LIPITOR) 20 mg, Oral, Daily    benzonatate (TESSALON) 200 mg, Oral, 2 times daily PRN    budesonide-formoterol 160-4.5 mcg (SYMBICORT) 160-4.5  "mcg/actuation HFAA 2 puffs, Inhalation, Every 12 hours, Controller    diclofenac sodium (VOLTAREN ARTHRITIS PAIN) 2 g, Topical (Top), 4 times daily    fluticasone propionate (FLONASE) 50 mcg, Each Nostril, Daily    losartan (COZAAR) 100 mg, Oral, Daily    montelukast (SINGULAIR) 10 mg, Oral, Nightly       Review of Systems   A comprehensive review of symptoms was completed and negative except as noted above.    Visit Vitals  BP (!) 148/80 (Patient Position: Sitting)   Pulse 79   Temp 97.5 °F (36.4 °C) (Temporal)   Ht 5' 5.98" (1.676 m)   Wt 86.8 kg (191 lb 6.4 oz)   SpO2 97%   BMI 30.91 kg/m²       Physical Exam  Vitals and nursing note reviewed.   Constitutional:       General: He is not in acute distress.     Appearance: Normal appearance. He is not toxic-appearing.   HENT:      Head: Normocephalic and atraumatic.      Nose: Nose normal.      Mouth/Throat:      Mouth: Mucous membranes are moist.      Pharynx: Oropharynx is clear.   Eyes:      Extraocular Movements: Extraocular movements intact.      Conjunctiva/sclera: Conjunctivae normal.      Pupils: Pupils are equal, round, and reactive to light.   Cardiovascular:      Rate and Rhythm: Normal rate and regular rhythm.      Heart sounds: Normal heart sounds. No murmur heard.     No friction rub. No gallop.   Pulmonary:      Effort: Pulmonary effort is normal.      Breath sounds: Normal breath sounds.   Musculoskeletal:         General: Normal range of motion.      Cervical back: Normal range of motion and neck supple.   Skin:     General: Skin is warm and dry.      Coloration: Skin is not jaundiced or pale.      Findings: No rash.   Neurological:      General: No focal deficit present.      Mental Status: He is alert and oriented to person, place, and time. Mental status is at baseline.   Psychiatric:         Mood and Affect: Mood normal.         Behavior: Behavior normal.         Thought Content: Thought content normal.         Judgment: Judgment normal.      "     Labs Reviewed:  Chemistry:  Lab Results   Component Value Date     04/08/2025    K 5.0 04/08/2025    BUN 20 04/08/2025    CREATININE 1.04 04/08/2025    EGFRNORACEVR 81 04/08/2025    GLUCOSE 78 04/08/2025    CALCIUM 8.9 04/08/2025    ALKPHOS 68 04/08/2025    LABPROT 7.1 04/08/2025    ALBUMIN 4.4 04/08/2025    AST 54 04/08/2025    ALT 53 (H) 04/08/2025    TSH 1.730 07/22/2024    PSA 0.87 07/22/2024        Lab Results   Component Value Date    HGBA1C 5.6 07/22/2024        Hematology:  Lab Results   Component Value Date    WBC 6.95 07/22/2024    RBC 5.46 07/22/2024    HGB 17.1 07/22/2024    HCT 49.9 07/22/2024    MCV 91.4 07/22/2024    MCH 31.3 07/22/2024    MCHC 34.3 07/22/2024    RDW 14.9 07/22/2024     07/22/2024    MPV 12.5 (H) 07/22/2024       Lipid Panel:  Lab Results   Component Value Date    CHOL 161 07/22/2024    HDL 61 (H) 07/22/2024    TRIG 122 07/22/2024        No results found for this or any previous visit (from the past 24 hours).    Assessment & Plan:  1. Primary hypertension  Uncontrolled. /80, start norvasc 2.5gm po daily.   Low Sodium Diet (DASH Diet - Less than 2 grams of sodium per day).  Monitor blood pressure daily and log. Report consistent numbers greater than 140/90.  Maintain healthy weight with goal BMI <30. Exercise 30 minutes per day, 5 days per week.  Smoking cessation encouraged to aid in BP reduction.  Overview:  On amlodipine (NORVASC) 2.5 MG tablet po daily.   On losartan (COZAAR) 100 MG tablet     Orders:  - START    amLODIPine (NORVASC) 2.5 MG tablet; Take 1 tablet (2.5 mg total) by mouth once daily.  Dispense: 90 tablet; Refill: 3    2. Hypercholesterolemia  Lab Results   Component Value Date    TRIG 122 07/22/2024    HDL 61 (H) 07/22/2024     Educated on dietary modifications. Follow a low cholesterol, low saturated fat diet with less that 200mg of cholesterol a day.  Avoid fried foods and high saturated fats.  Increase dietary fiber.  Regular exercise can  reduce LDL (bad cholesterol) and raise HDL (good cholesterol). Encourage physical activity 5 times per week for 30 minutes per day.   Overview:  On atorvastatin (LIPITOR) 20 MG tablet po daily.     Orders:  -     Comprehensive Metabolic Panel    3. Panlobular emphysema  Smoking cessation.   Overview:  On albuterol (VENTOLIN HFA) 90 mcg/actuation inhaler 2 puffs every 6 hours PRN.   On budesonide-formoterol 160-4.5 mcg (SYMBICORT) 160-4.5 mcg/actuation HFAA 2 puffs, every 12 hours.        4. Environmental and seasonal allergies  Overview:  On fluticasone propionate (FLONASE) 50 mcg/actuation nasal spray 1 spray daily.  On montelukast (SINGULAIR) 10 mg tablet po nightly.          Assessment & Plan    IMPRESSION:  - Assessed BP, initially elevated at 148/80.  - Added and started new BP medication to better control HTN.  - Considered history of sinus problems and previous treatment.    HYPERTENSION:  - Evaluated the patient's blood pressure, which was initially high at 148/80, then improved to 100 after taking medication.  - Assessed that the current blood pressure medication might be at a low dose.  - Acknowledged the need to lower blood pressure gradually to avoid extreme reactions.  - Added another antihypertensive medication to the patient's regimen.  - Prescribed the patient to continue taking blood pressure medication daily.  - Ordered a chemistry panel.    ENVIRONMENTAL AND SEASONAL ALLERGIES:   - Noted the patient's history of recurrent sinusitis, with the last visit for this issue on February 10, 2005.  - Confirmed ongoing sinus problems reported by the patient.  - Evaluated the efficacy of previously prescribed medication, which helps somewhat with sinus problems.  - Assessed the patient's feedback that the medication's effect wears off quicker than indicated.  - Will consider adjusting the dosage or frequency of the sinus medication based on the patient's report.  - Advised the patient to continue the current  treatment regimen for sinusitis.  - Instructed the patient to monitor symptoms and report any significant changes or worsening of condition.    FOLLOW-UP:  - Scheduled a follow-up visit in 2 weeks to reassess blood pressure.          Follow up for BP check and med eval-amplodipine in 2 weeks. .   Return to the clinic as needed.    Future Appointments   Date Time Provider Department Center   4/22/2025  3:00 PM Lisha Chavez NP [facility]       Lab Frequency Next Occurrence   Ambulatory referral/consult to Physical/Occupational Therapy Once 07/29/2024   Ambulatory referral/consult to General Surgery Once 08/07/2024   Ambulatory referral/consult to Cardiology Once 09/22/2024   Ambulatory referral/consult to Orthopedics Once 09/22/2024   X-Ray Chest PA And Lateral Once 03/12/2025        This note was generated with the assistance of ambient listening technology. Verbal consent was obtained by the patient and accompanying visitor(s) for the recording of patient appointment to facilitate this note. I attest to having reviewed and edited the generated note for accuracy, though some syntax or spelling errors may persist. Please contact the author of this note for any clarification.          HENRY Jolley

## 2025-04-23 ENCOUNTER — OFFICE VISIT (OUTPATIENT)
Dept: FAMILY MEDICINE | Facility: CLINIC | Age: 65
End: 2025-04-23
Payer: MEDICAID

## 2025-04-23 DIAGNOSIS — I10 PRIMARY HYPERTENSION: ICD-10-CM

## 2025-04-23 PROCEDURE — 3077F SYST BP >= 140 MM HG: CPT | Mod: CPTII,,, | Performed by: NURSE PRACTITIONER

## 2025-04-23 PROCEDURE — 4010F ACE/ARB THERAPY RXD/TAKEN: CPT | Mod: CPTII,,, | Performed by: NURSE PRACTITIONER

## 2025-04-23 PROCEDURE — 99214 OFFICE O/P EST MOD 30 MIN: CPT | Mod: ,,, | Performed by: NURSE PRACTITIONER

## 2025-04-23 PROCEDURE — 1159F MED LIST DOCD IN RCRD: CPT | Mod: CPTII,,, | Performed by: NURSE PRACTITIONER

## 2025-04-23 PROCEDURE — G2211 COMPLEX E/M VISIT ADD ON: HCPCS | Mod: ,,, | Performed by: NURSE PRACTITIONER

## 2025-04-23 PROCEDURE — 1160F RVW MEDS BY RX/DR IN RCRD: CPT | Mod: CPTII,,, | Performed by: NURSE PRACTITIONER

## 2025-04-23 PROCEDURE — 3078F DIAST BP <80 MM HG: CPT | Mod: CPTII,,, | Performed by: NURSE PRACTITIONER

## 2025-04-23 PROCEDURE — 3008F BODY MASS INDEX DOCD: CPT | Mod: CPTII,,, | Performed by: NURSE PRACTITIONER

## 2025-04-23 RX ORDER — AMLODIPINE BESYLATE 5 MG/1
5 TABLET ORAL DAILY
Qty: 90 TABLET | Refills: 0 | Status: SHIPPED | OUTPATIENT
Start: 2025-04-23 | End: 2025-04-23

## 2025-04-23 RX ORDER — AMLODIPINE BESYLATE 2.5 MG/1
5 TABLET ORAL DAILY
Qty: 180 TABLET | Refills: 3 | Status: SHIPPED | OUTPATIENT
Start: 2025-04-23 | End: 2025-04-23 | Stop reason: SDUPTHER

## 2025-04-23 RX ORDER — AMLODIPINE BESYLATE 5 MG/1
5 TABLET ORAL DAILY
Qty: 30 TABLET | Refills: 0 | Status: SHIPPED | OUTPATIENT
Start: 2025-04-23

## 2025-04-23 NOTE — PROGRESS NOTES
Patient ID: Tommy Ramirez  : 1960    Chief Complaint: Follow-up (B/P check, Med eval for amlodipine)    Allergies: Patient is allergic to penicillins.     History of Present Illness    Patient presents today for blood pressure follow up    HYPERTENSION:  He reports blood pressure of 148/80. He denies chest pain or shortness of breath. He continues amlodipine (Norvasc) twice daily.    PULMONARY:  PFT was completed in March.      ROS:  General: -fever, -chills, -fatigue, -weight gain, -weight loss  Eyes: -vision changes, -redness, -discharge  ENT: -ear pain, -nasal congestion, -sore throat  Cardiovascular: -chest pain, -palpitations, -lower extremity edema  Respiratory: -cough, -shortness of breath  Gastrointestinal: -abdominal pain, -nausea, -vomiting, -diarrhea, -constipation, -blood in stool  Genitourinary: -dysuria, -hematuria, -frequency  Musculoskeletal: -joint pain, -muscle pain  Skin: -rash, -lesion  Neurological: -headache, -dizziness, -numbness, -tingling  Psychiatric: -anxiety, -depression, -sleep difficulty          Social History:  reports that he has been smoking cigarettes. He has been exposed to tobacco smoke. He has never used smokeless tobacco. He reports current alcohol use of about 28.0 standard drinks of alcohol per week. He reports that he does not use drugs.    Past Medical History:  has a past medical history of High cholesterol and Hypertension.    Surgical History: History reviewed. No pertinent surgical history.    Current Medications:  Current Outpatient Medications   Medication Instructions    albuterol (VENTOLIN HFA) 90 mcg/actuation inhaler 2 puffs, Inhalation, Every 6 hours PRN, Rescue    amLODIPine (NORVASC) 5 mg, Oral, Daily    aspirin (ECOTRIN) 81 mg, Daily    atorvastatin (LIPITOR) 20 mg, Oral, Daily    benzonatate (TESSALON) 200 mg, Oral, 2 times daily PRN    budesonide-formoterol 160-4.5 mcg (SYMBICORT) 160-4.5 mcg/actuation HFAA 2 puffs, Inhalation, Every 12 hours,  "Controller    diclofenac sodium (VOLTAREN ARTHRITIS PAIN) 2 g, Topical (Top), 4 times daily    fluticasone propionate (FLONASE) 50 mcg, Each Nostril, Daily    losartan (COZAAR) 100 mg, Oral, Daily    montelukast (SINGULAIR) 10 mg, Oral, Nightly       Review of Systems   A comprehensive review of symptoms was completed and negative except as noted above.    Visit Vitals  BP (!) 153/72   Pulse 73   Temp 97.6 °F (36.4 °C) (Temporal)   Ht 5' 5.98" (1.676 m)   Wt 87.5 kg (193 lb)   SpO2 96%   BMI 31.17 kg/m²       Physical Exam  Vitals and nursing note reviewed.   Constitutional:       General: He is not in acute distress.     Appearance: Normal appearance. He is not toxic-appearing.   HENT:      Head: Normocephalic and atraumatic.      Nose: Nose normal.      Mouth/Throat:      Mouth: Mucous membranes are moist.      Pharynx: Oropharynx is clear.   Eyes:      Extraocular Movements: Extraocular movements intact.      Conjunctiva/sclera: Conjunctivae normal.      Pupils: Pupils are equal, round, and reactive to light.   Cardiovascular:      Rate and Rhythm: Normal rate and regular rhythm.      Heart sounds: Normal heart sounds. No murmur heard.     No friction rub. No gallop.   Pulmonary:      Effort: Pulmonary effort is normal.      Breath sounds: Normal breath sounds.   Musculoskeletal:         General: Normal range of motion.      Cervical back: Normal range of motion and neck supple.   Skin:     General: Skin is warm and dry.      Coloration: Skin is not jaundiced or pale.      Findings: No rash.   Neurological:      General: No focal deficit present.      Mental Status: He is alert and oriented to person, place, and time. Mental status is at baseline.   Psychiatric:         Mood and Affect: Mood normal.         Behavior: Behavior normal.         Thought Content: Thought content normal.         Judgment: Judgment normal.        Labs Reviewed:  Chemistry:  Lab Results   Component Value Date     04/08/2025    K " 5.0 04/08/2025    BUN 20 04/08/2025    CREATININE 1.04 04/08/2025    EGFRNORACEVR 81 04/08/2025    GLUCOSE 78 04/08/2025    CALCIUM 8.9 04/08/2025    ALKPHOS 68 04/08/2025    LABPROT 7.1 04/08/2025    ALBUMIN 4.4 04/08/2025    AST 54 04/08/2025    ALT 53 (H) 04/08/2025    TSH 1.730 07/22/2024    PSA 0.87 07/22/2024        Lab Results   Component Value Date    HGBA1C 5.6 07/22/2024        Hematology:  Lab Results   Component Value Date    WBC 6.95 07/22/2024    RBC 5.46 07/22/2024    HGB 17.1 07/22/2024    HCT 49.9 07/22/2024    MCV 91.4 07/22/2024    MCH 31.3 07/22/2024    MCHC 34.3 07/22/2024    RDW 14.9 07/22/2024     07/22/2024    MPV 12.5 (H) 07/22/2024       Lipid Panel:  Lab Results   Component Value Date    CHOL 161 07/22/2024    HDL 61 (H) 07/22/2024    TRIG 122 07/22/2024        No results found for this or any previous visit (from the past 24 hours).    Assessment & Plan:  1. Primary hypertension  Overview:  On amlodipine (NORVASC) 5 MG tablet po daily.   On losartan (COZAAR) 100 MG tablet     Orders:  -     Discontinue: amLODIPine (NORVASC) 2.5 MG tablet; Take 2 tablets (5 mg total) by mouth once daily.  Dispense: 180 tablet; Refill: 3  -  start   amLODIPine (NORVASC) 5 MG tablet; Take 1 tablet (5 mg total) by mouth once daily.  Dispense: 30 tablet; Refill: 0       Assessment & Plan    - BP increased from previous reading of 148/80 to current 161/80.  - Evaluated the patient's blood pressure, noting an improvement from 148/80 to 161/80 after rechecking, but still elevated despite current treatment with amlodipine (Norvasc) 2.5mg twice daily.  - Assessed the need to increase medication dosage to improve blood pressure control.  - Increased amlodipine (Norvasc) from 2.5 mg to 5 mg daily to improve BP control and reduce stroke risk.  - Emphasized the importance of controlling blood pressure to prevent stroke.        FOLLOW-UP:  - Scheduled a follow-up visit in 2 weeks for blood pressure recheck.           Follow up in about 2 weeks (around 5/7/2025) for BP Check.   Return to the clinic as needed.    Future Appointments   Date Time Provider Department Center   5/8/2025  2:30 PM Lisha Chavez NP [facility]       Lab Frequency Next Occurrence   Ambulatory referral/consult to Physical/Occupational Therapy Once 07/29/2024   Ambulatory referral/consult to General Surgery Once 08/07/2024   Ambulatory referral/consult to Cardiology Once 09/22/2024   Ambulatory referral/consult to Orthopedics Once 09/22/2024   X-Ray Chest PA And Lateral Once 03/12/2025        This note was generated with the assistance of ambient listening technology. Verbal consent was obtained by the patient and accompanying visitor(s) for the recording of patient appointment to facilitate this note. I attest to having reviewed and edited the generated note for accuracy, though some syntax or spelling errors may persist. Please contact the author of this note for any clarification.          HENRY Jolley

## 2025-04-27 VITALS
BODY MASS INDEX: 31.02 KG/M2 | OXYGEN SATURATION: 96 % | DIASTOLIC BLOOD PRESSURE: 72 MMHG | WEIGHT: 193 LBS | HEIGHT: 66 IN | HEART RATE: 73 BPM | SYSTOLIC BLOOD PRESSURE: 153 MMHG | TEMPERATURE: 98 F

## 2025-05-08 ENCOUNTER — OFFICE VISIT (OUTPATIENT)
Dept: FAMILY MEDICINE | Facility: CLINIC | Age: 65
End: 2025-05-08
Payer: MEDICAID

## 2025-05-08 DIAGNOSIS — I10 PRIMARY HYPERTENSION: Primary | ICD-10-CM

## 2025-05-08 DIAGNOSIS — K08.89 PAIN, DENTAL: ICD-10-CM

## 2025-05-08 DIAGNOSIS — K04.7 DENTAL ABSCESS: ICD-10-CM

## 2025-05-08 PROCEDURE — G2211 COMPLEX E/M VISIT ADD ON: HCPCS | Mod: ,,, | Performed by: NURSE PRACTITIONER

## 2025-05-08 PROCEDURE — 99213 OFFICE O/P EST LOW 20 MIN: CPT | Mod: ,,, | Performed by: NURSE PRACTITIONER

## 2025-05-08 PROCEDURE — 3075F SYST BP GE 130 - 139MM HG: CPT | Mod: CPTII,,, | Performed by: NURSE PRACTITIONER

## 2025-05-08 PROCEDURE — 3008F BODY MASS INDEX DOCD: CPT | Mod: CPTII,,, | Performed by: NURSE PRACTITIONER

## 2025-05-08 PROCEDURE — 1160F RVW MEDS BY RX/DR IN RCRD: CPT | Mod: CPTII,,, | Performed by: NURSE PRACTITIONER

## 2025-05-08 PROCEDURE — 3078F DIAST BP <80 MM HG: CPT | Mod: CPTII,,, | Performed by: NURSE PRACTITIONER

## 2025-05-08 PROCEDURE — 4010F ACE/ARB THERAPY RXD/TAKEN: CPT | Mod: CPTII,,, | Performed by: NURSE PRACTITIONER

## 2025-05-08 PROCEDURE — 1159F MED LIST DOCD IN RCRD: CPT | Mod: CPTII,,, | Performed by: NURSE PRACTITIONER

## 2025-05-08 RX ORDER — CLINDAMYCIN HYDROCHLORIDE 300 MG/1
300 CAPSULE ORAL 3 TIMES DAILY
Qty: 21 CAPSULE | Refills: 0 | Status: SHIPPED | OUTPATIENT
Start: 2025-05-08 | End: 2025-05-15

## 2025-05-08 RX ORDER — HYDROCODONE BITARTRATE AND ACETAMINOPHEN 7.5; 325 MG/1; MG/1
1 TABLET ORAL EVERY 6 HOURS PRN
Qty: 12 TABLET | Refills: 0 | Status: SHIPPED | OUTPATIENT
Start: 2025-05-08

## 2025-05-08 RX ORDER — IBUPROFEN 800 MG/1
800 TABLET, FILM COATED ORAL EVERY 8 HOURS PRN
Qty: 30 TABLET | Refills: 1 | Status: SHIPPED | OUTPATIENT
Start: 2025-05-08

## 2025-05-08 NOTE — PROGRESS NOTES
Patient ID: Tommy Ramirez  : 1960    Chief Complaint: Blood Pressure Check    Allergies: Patient is allergic to penicillins.     History of Present Illness    Patient presents today for BP check and with dental pain and tooth swelling    DENTAL PAIN:  He reports severe dental pain associated with a loose tooth and surrounding tissue swelling.    HYPERTENSION:  He reports home blood pressure readings of 134/78. Current medications include amlodipine and losartan.    ALLERGIES:  He reports allergy to penicillin.    MEDICAL HISTORY:  He denies history of myocardial infarction or coronary stent placement.      ROS:  General: -fever, -chills, -fatigue, -weight gain, -weight loss  Eyes: -vision changes, -redness, -discharge  ENT: -ear pain, -nasal congestion, -sore throat  Cardiovascular: -chest pain, -palpitations, -lower extremity edema  Respiratory: -cough, -shortness of breath  Gastrointestinal: -abdominal pain, -nausea, -vomiting, -diarrhea, -constipation, -blood in stool  Genitourinary: -dysuria, -hematuria, -frequency  Musculoskeletal: -joint pain, -muscle pain  Skin: -rash, -lesion  Neurological: -headache, -dizziness, -numbness, -tingling  Psychiatric: -anxiety, -depression, -sleep difficulty  Head: +tooth pain, +teeth problems          Social History:  reports that he has been smoking cigarettes. He has been exposed to tobacco smoke. He has never used smokeless tobacco. He reports current alcohol use of about 28.0 standard drinks of alcohol per week. He reports that he does not use drugs.    Past Medical History:  has a past medical history of High cholesterol and Hypertension.    Surgical History: History reviewed. No pertinent surgical history.    Current Medications:  Current Outpatient Medications   Medication Instructions    albuterol (VENTOLIN HFA) 90 mcg/actuation inhaler 2 puffs, Inhalation, Every 6 hours PRN, Rescue    amLODIPine (NORVASC) 5 mg, Oral, Daily    aspirin (ECOTRIN) 81 mg, Daily  "   atorvastatin (LIPITOR) 20 mg, Oral, Daily    benzonatate (TESSALON) 200 mg, Oral, 2 times daily PRN    budesonide-formoterol 160-4.5 mcg (SYMBICORT) 160-4.5 mcg/actuation HFAA 2 puffs, Inhalation, Every 12 hours, Controller    clindamycin (CLEOCIN) 300 mg, Oral, 3 times daily    diclofenac sodium (VOLTAREN ARTHRITIS PAIN) 2 g, Topical (Top), 4 times daily    fluticasone propionate (FLONASE) 50 mcg, Each Nostril, Daily    HYDROcodone-acetaminophen (NORCO) 7.5-325 mg per tablet 1 tablet, Oral, Every 6 hours PRN    ibuprofen (ADVIL,MOTRIN) 800 mg, Oral, Every 8 hours PRN    losartan (COZAAR) 100 mg, Oral, Daily    montelukast (SINGULAIR) 10 mg, Oral, Nightly       Review of Systems   A comprehensive review of symptoms was completed and negative except as noted above.    Visit Vitals  /78   Pulse 88   Temp 97.8 °F (36.6 °C) (Temporal)   Ht 5' 5.98" (1.676 m)   Wt 85.1 kg (187 lb 9.6 oz)   SpO2 96%   BMI 30.29 kg/m²       Physical Exam  Vitals and nursing note reviewed.   Constitutional:       General: He is not in acute distress.     Appearance: Normal appearance. He is not toxic-appearing.   HENT:      Head: Normocephalic and atraumatic.      Nose: Nose normal.      Mouth/Throat:      Mouth: Mucous membranes are moist.      Dentition: Dental tenderness, dental caries and dental abscesses present.      Pharynx: Oropharynx is clear.   Eyes:      Extraocular Movements: Extraocular movements intact.      Conjunctiva/sclera: Conjunctivae normal.      Pupils: Pupils are equal, round, and reactive to light.   Cardiovascular:      Rate and Rhythm: Normal rate and regular rhythm.      Heart sounds: Normal heart sounds. No murmur heard.     No friction rub. No gallop.   Pulmonary:      Effort: Pulmonary effort is normal.      Breath sounds: Normal breath sounds.   Musculoskeletal:         General: Normal range of motion.      Cervical back: Normal range of motion and neck supple.   Skin:     General: Skin is warm and " dry.      Coloration: Skin is not jaundiced or pale.      Findings: No rash.   Neurological:      General: No focal deficit present.      Mental Status: He is alert and oriented to person, place, and time. Mental status is at baseline.   Psychiatric:         Mood and Affect: Mood normal.         Behavior: Behavior normal.         Thought Content: Thought content normal.         Judgment: Judgment normal.        Labs Reviewed:  Chemistry:  Lab Results   Component Value Date     04/08/2025    K 5.0 04/08/2025    BUN 20 04/08/2025    CREATININE 1.04 04/08/2025    EGFRNORACEVR 81 04/08/2025    CALCIUM 8.9 04/08/2025    ALKPHOS 68 04/08/2025    ALBUMIN 4.4 04/08/2025    AST 54 04/08/2025    ALT 53 (H) 04/08/2025    TSH 1.730 07/22/2024    PSA 0.87 07/22/2024        Lab Results   Component Value Date    HGBA1C 5.6 07/22/2024        Hematology:  Lab Results   Component Value Date    WBC 6.95 07/22/2024    RBC 5.46 07/22/2024    HGB 17.1 07/22/2024    HCT 49.9 07/22/2024    MCV 91.4 07/22/2024    MCH 31.3 07/22/2024    MCHC 34.3 07/22/2024    RDW 14.9 07/22/2024     07/22/2024    MPV 12.5 (H) 07/22/2024       Lipid Panel:  Lab Results   Component Value Date    CHOL 161 07/22/2024    HDL 61 (H) 07/22/2024    TRIG 122 07/22/2024        No results found for this or any previous visit (from the past 24 hours).    Assessment & Plan:  1. Primary hypertension  Overview:  On amlodipine (NORVASC) 2.5 MG tablet po daily.   On losartan (COZAAR) 100 MG tablet       2. Pain, dental  -     clindamycin (CLEOCIN) 300 MG capsule; Take 1 capsule (300 mg total) by mouth 3 (three) times daily. for 7 days  Dispense: 21 capsule; Refill: 0  -     HYDROcodone-acetaminophen (NORCO) 7.5-325 mg per tablet; Take 1 tablet by mouth every 6 (six) hours as needed for Pain.  Dispense: 12 tablet; Refill: 0  -     ibuprofen (ADVIL,MOTRIN) 800 MG tablet; Take 1 tablet (800 mg total) by mouth every 8 (eight) hours as needed for Pain.  Dispense: 30  tablet; Refill: 1    3. Dental abscess  -     clindamycin (CLEOCIN) 300 MG capsule; Take 1 capsule (300 mg total) by mouth 3 (three) times daily. for 7 days  Dispense: 21 capsule; Refill: 0       Assessment & Plan      IMPRESSION:  - Rechecked BP in 2 weeks due to current dental pain potentially affecting readings.  - Home BP reading of 134/78.  - Goal is to achieve BP less than 140/90.    DENTAL INFECTION AND PAIN:  - Evaluated patient's loose tooth with significant surrounding swelling and horrible pain.  - Prescribed clindamycin for dental infection due to patient's penicillin allergy.  - For pain management, prescribed hydrocodone and prescription-strength ibuprofen (800mg) every 8 hours as needed.  - Emphasized the importance of completing the full course of antibiotics.    HYPERTENSION:  - Monitored home blood pressure readings of 134/78, which is within goal of less than 140/90.  - Continued amlodipine and losartan at current doses.  - Will recheck blood pressure in 2 weeks after dental pain subsides.    PENICILLIN ALLERGY:  - Noted patient's penicillin allergy, which guided antibiotic selection for dental infection.          Follow up in about 2 weeks (around 5/22/2025) for BP Check.   Return to the clinic as needed.    Future Appointments   Date Time Provider Department Center   5/27/2025  2:30 PM Lisha Chavez NP [facility]       Lab Frequency Next Occurrence   Ambulatory referral/consult to Physical/Occupational Therapy Once 07/29/2024   Ambulatory referral/consult to General Surgery Once 08/07/2024   Ambulatory referral/consult to Cardiology Once 09/22/2024   Ambulatory referral/consult to Orthopedics Once 09/22/2024   X-Ray Chest PA And Lateral Once 03/12/2025        This note was generated with the assistance of ambient listening technology. Verbal consent was obtained by the patient and accompanying visitor(s) for the recording of patient appointment to facilitate this note. I attest to  having reviewed and edited the generated note for accuracy, though some syntax or spelling errors may persist. Please contact the author of this note for any clarification.          LARRY JolleyC

## 2025-05-11 VITALS
SYSTOLIC BLOOD PRESSURE: 134 MMHG | HEIGHT: 66 IN | TEMPERATURE: 98 F | DIASTOLIC BLOOD PRESSURE: 78 MMHG | WEIGHT: 187.63 LBS | HEART RATE: 88 BPM | BODY MASS INDEX: 30.16 KG/M2 | OXYGEN SATURATION: 96 %

## 2025-05-27 ENCOUNTER — OFFICE VISIT (OUTPATIENT)
Dept: FAMILY MEDICINE | Facility: CLINIC | Age: 65
End: 2025-05-27
Payer: MEDICAID

## 2025-05-27 VITALS
TEMPERATURE: 97 F | DIASTOLIC BLOOD PRESSURE: 73 MMHG | SYSTOLIC BLOOD PRESSURE: 136 MMHG | HEART RATE: 83 BPM | OXYGEN SATURATION: 99 %

## 2025-05-27 DIAGNOSIS — I10 PRIMARY HYPERTENSION: Primary | ICD-10-CM

## 2025-05-27 PROCEDURE — 4010F ACE/ARB THERAPY RXD/TAKEN: CPT | Mod: CPTII,,, | Performed by: NURSE PRACTITIONER

## 2025-05-27 PROCEDURE — 99213 OFFICE O/P EST LOW 20 MIN: CPT | Mod: ,,, | Performed by: NURSE PRACTITIONER

## 2025-05-27 PROCEDURE — 1159F MED LIST DOCD IN RCRD: CPT | Mod: CPTII,,, | Performed by: NURSE PRACTITIONER

## 2025-05-27 PROCEDURE — 3078F DIAST BP <80 MM HG: CPT | Mod: CPTII,,, | Performed by: NURSE PRACTITIONER

## 2025-05-27 PROCEDURE — 1160F RVW MEDS BY RX/DR IN RCRD: CPT | Mod: CPTII,,, | Performed by: NURSE PRACTITIONER

## 2025-05-27 PROCEDURE — 3075F SYST BP GE 130 - 139MM HG: CPT | Mod: CPTII,,, | Performed by: NURSE PRACTITIONER

## 2025-05-28 NOTE — PROGRESS NOTES
Patient ID: Tommy Ramirez  : 1960    Chief Complaint: Blood Pressure Check    Allergies: Patient is allergic to penicillins.     History of Present Illness    Patient presents today for blood pressure follow-up    HYPERTENSION:  He reports difficulty with blood pressure regulation. He was last seen on May 8th, 2025 for blood pressure check and pain management.      ROS:  General: -fever, -chills, -fatigue, -weight gain, -weight loss  Eyes: -vision changes, -redness, -discharge  ENT: -ear pain, -nasal congestion, -sore throat  Cardiovascular: -chest pain, -palpitations, -lower extremity edema  Respiratory: -cough, -shortness of breath  Gastrointestinal: -abdominal pain, -nausea, -vomiting, -diarrhea, -constipation, -blood in stool  Genitourinary: -dysuria, -hematuria, -frequency  Musculoskeletal: -joint pain, -muscle pain  Skin: -rash, -lesion  Neurological: -headache, -dizziness, -numbness, -tingling  Psychiatric: -anxiety, -depression, -sleep difficulty          Social History:  reports that he has been smoking cigarettes. He has been exposed to tobacco smoke. He has never used smokeless tobacco. He reports current alcohol use of about 28.0 standard drinks of alcohol per week. He reports that he does not use drugs.    Past Medical History:  has a past medical history of Hypertension.    Surgical History: History reviewed. No pertinent surgical history.    Current Medications:  Current Outpatient Medications   Medication Instructions    albuterol (VENTOLIN HFA) 90 mcg/actuation inhaler 2 puffs, Inhalation, Every 6 hours PRN, Rescue    amLODIPine (NORVASC) 5 mg, Oral, Daily    aspirin (ECOTRIN) 81 mg, Daily    atorvastatin (LIPITOR) 20 mg, Oral, Daily    benzonatate (TESSALON) 200 mg, Oral, 2 times daily PRN    budesonide-formoterol 160-4.5 mcg (SYMBICORT) 160-4.5 mcg/actuation HFAA 2 puffs, Inhalation, Every 12 hours, Controller    diclofenac sodium (VOLTAREN ARTHRITIS PAIN) 2 g, Topical (Top), 4 times  daily    fluticasone propionate (FLONASE) 50 mcg, Each Nostril, Daily    HYDROcodone-acetaminophen (NORCO) 7.5-325 mg per tablet 1 tablet, Oral, Every 6 hours PRN    ibuprofen (ADVIL,MOTRIN) 800 mg, Oral, Every 8 hours PRN    losartan (COZAAR) 100 mg, Oral, Daily    montelukast (SINGULAIR) 10 mg, Oral, Nightly       Review of Systems   A comprehensive review of symptoms was completed and negative except as noted above.    Visit Vitals  /73 (BP Location: Left arm, Patient Position: Sitting)   Pulse 83   Temp 97 °F (36.1 °C) (Temporal)   SpO2 99%       Physical Exam  Vitals and nursing note reviewed.   Constitutional:       General: He is not in acute distress.     Appearance: Normal appearance. He is not toxic-appearing.   HENT:      Head: Normocephalic and atraumatic.      Nose: Nose normal.      Mouth/Throat:      Mouth: Mucous membranes are moist.      Pharynx: Oropharynx is clear.   Eyes:      Extraocular Movements: Extraocular movements intact.      Conjunctiva/sclera: Conjunctivae normal.      Pupils: Pupils are equal, round, and reactive to light.   Cardiovascular:      Rate and Rhythm: Normal rate and regular rhythm.      Heart sounds: Normal heart sounds. No murmur heard.     No friction rub. No gallop.   Pulmonary:      Effort: Pulmonary effort is normal.      Breath sounds: Normal breath sounds.   Musculoskeletal:         General: Normal range of motion.      Cervical back: Normal range of motion and neck supple.   Skin:     General: Skin is warm and dry.      Coloration: Skin is not jaundiced or pale.      Findings: No rash.   Neurological:      General: No focal deficit present.      Mental Status: He is alert and oriented to person, place, and time. Mental status is at baseline.   Psychiatric:         Mood and Affect: Mood normal.         Behavior: Behavior normal.         Thought Content: Thought content normal.         Judgment: Judgment normal.        Labs Reviewed:  Chemistry:  Lab Results    Component Value Date     04/08/2025    K 5.0 04/08/2025    BUN 20 04/08/2025    CREATININE 1.04 04/08/2025    EGFRNORACEVR 81 04/08/2025    CALCIUM 8.9 04/08/2025    ALKPHOS 68 04/08/2025    ALBUMIN 4.4 04/08/2025    AST 54 04/08/2025    ALT 53 (H) 04/08/2025    TSH 1.730 07/22/2024    PSA 0.87 07/22/2024        Lab Results   Component Value Date    HGBA1C 5.6 07/22/2024        Hematology:  Lab Results   Component Value Date    WBC 6.95 07/22/2024    RBC 5.46 07/22/2024    HGB 17.1 07/22/2024    HCT 49.9 07/22/2024    MCV 91.4 07/22/2024    MCH 31.3 07/22/2024    MCHC 34.3 07/22/2024    RDW 14.9 07/22/2024     07/22/2024    MPV 12.5 (H) 07/22/2024       Lipid Panel:  Lab Results   Component Value Date    CHOL 161 07/22/2024    HDL 61 (H) 07/22/2024    TRIG 122 07/22/2024        No results found for this or any previous visit (from the past 24 hours).    Assessment & Plan:  1. Primary hypertension  Overview:  On amlodipine (NORVASC) 5 MG tablet po daily.   On losartan (COZAAR) 100 MG tablet     Well controlled. /73  Continue losartan and amlodipine as rx.   Low Sodium Diet (DASH Diet - Less than 2 grams of sodium per day).  Monitor blood pressure daily and log. Report consistent numbers greater than 140/90.  Maintain healthy weight with goal BMI <30. Exercise 30 minutes per day, 5 days per week.  Smoking cessation encouraged to aid in BP reduction.  - Monitored the patient's blood pressure, which was recorded as 136/73 during today's follow-up visit.  - Patient was last seen on May 8th for high blood pressure check, indicating appropriate ongoing monitoring.    FOLLOW-UP AND MONITORING:  - Confirmed the patient is current with appointments and care gaps.  - Scheduled follow-up for annual labs and wellness visit due around August 1st to continue management of hypertension.          Follow up in about 2 months (around 8/4/2025) for Annual/wellness, Fasting Labs Prior.   Return to the clinic as  needed.    Future Appointments   Date Time Provider Department Center   8/26/2025  8:25 AM NURSE, [facility]   8/27/2025  3:00 PM Lisha Chavez, [facility]       Lab Frequency Next Occurrence   Ambulatory referral/consult to Physical/Occupational Therapy Once 07/29/2024   Ambulatory referral/consult to General Surgery Once 08/07/2024   Ambulatory referral/consult to Cardiology Once 09/22/2024   Ambulatory referral/consult to Orthopedics Once 09/22/2024   X-Ray Chest PA And Lateral Once 03/12/2025        This note was generated with the assistance of ambient listening technology. Verbal consent was obtained by the patient and accompanying visitor(s) for the recording of patient appointment to facilitate this note. I attest to having reviewed and edited the generated note for accuracy, though some syntax or spelling errors may persist. Please contact the author of this note for any clarification.          HENRY Jolley

## 2025-07-13 DIAGNOSIS — I10 PRIMARY HYPERTENSION: ICD-10-CM

## 2025-07-14 RX ORDER — AMLODIPINE BESYLATE 5 MG/1
5 TABLET ORAL
Qty: 90 TABLET | Refills: 0 | Status: SHIPPED | OUTPATIENT
Start: 2025-07-14